# Patient Record
Sex: MALE | Race: WHITE | NOT HISPANIC OR LATINO | Employment: FULL TIME | URBAN - METROPOLITAN AREA
[De-identification: names, ages, dates, MRNs, and addresses within clinical notes are randomized per-mention and may not be internally consistent; named-entity substitution may affect disease eponyms.]

---

## 2023-01-13 ENCOUNTER — HOSPITAL ENCOUNTER (EMERGENCY)
Facility: CLINIC | Age: 23
Discharge: HOME | End: 2023-01-13
Attending: EMERGENCY MEDICINE | Admitting: EMERGENCY MEDICINE
Payer: COMMERCIAL

## 2023-01-13 ENCOUNTER — APPOINTMENT (OUTPATIENT)
Dept: ULTRASOUND IMAGING | Facility: CLINIC | Age: 23
End: 2023-01-13
Attending: EMERGENCY MEDICINE
Payer: COMMERCIAL

## 2023-01-13 VITALS
TEMPERATURE: 97 F | SYSTOLIC BLOOD PRESSURE: 136 MMHG | DIASTOLIC BLOOD PRESSURE: 78 MMHG | OXYGEN SATURATION: 98 % | HEART RATE: 67 BPM | RESPIRATION RATE: 18 BRPM

## 2023-01-13 DIAGNOSIS — K80.50 BILIARY COLIC: ICD-10-CM

## 2023-01-13 LAB
ALBUMIN SERPL-MCNC: 4.4 G/DL (ref 3.4–5)
ALP SERPL-CCNC: 73 U/L (ref 40–150)
ALT SERPL W P-5'-P-CCNC: 42 U/L (ref 0–70)
ANION GAP SERPL CALCULATED.3IONS-SCNC: 7 MMOL/L (ref 3–14)
AST SERPL W P-5'-P-CCNC: 28 U/L (ref 0–45)
BASOPHILS # BLD AUTO: 0 10E3/UL (ref 0–0.2)
BASOPHILS NFR BLD AUTO: 1 %
BILIRUB SERPL-MCNC: 0.4 MG/DL (ref 0.2–1.3)
BUN SERPL-MCNC: 7 MG/DL (ref 7–30)
CALCIUM SERPL-MCNC: 9.9 MG/DL (ref 8.5–10.1)
CHLORIDE BLD-SCNC: 103 MMOL/L (ref 94–109)
CO2 SERPL-SCNC: 30 MMOL/L (ref 20–32)
CREAT SERPL-MCNC: 1.02 MG/DL (ref 0.66–1.25)
EOSINOPHIL # BLD AUTO: 0.1 10E3/UL (ref 0–0.7)
EOSINOPHIL NFR BLD AUTO: 2 %
ERYTHROCYTE [DISTWIDTH] IN BLOOD BY AUTOMATED COUNT: 11.6 % (ref 10–15)
GFR SERPL CREATININE-BSD FRML MDRD: >90 ML/MIN/1.73M2
GLUCOSE BLD-MCNC: 97 MG/DL (ref 70–99)
HCT VFR BLD AUTO: 46 % (ref 40–53)
HGB BLD-MCNC: 15.3 G/DL (ref 13.3–17.7)
IMM GRANULOCYTES # BLD: 0 10E3/UL
IMM GRANULOCYTES NFR BLD: 1 %
LIPASE SERPL-CCNC: 172 U/L (ref 73–393)
LYMPHOCYTES # BLD AUTO: 2.2 10E3/UL (ref 0.8–5.3)
LYMPHOCYTES NFR BLD AUTO: 38 %
MCH RBC QN AUTO: 27.8 PG (ref 26.5–33)
MCHC RBC AUTO-ENTMCNC: 33.3 G/DL (ref 31.5–36.5)
MCV RBC AUTO: 84 FL (ref 78–100)
MONOCYTES # BLD AUTO: 0.4 10E3/UL (ref 0–1.3)
MONOCYTES NFR BLD AUTO: 7 %
NEUTROPHILS # BLD AUTO: 3 10E3/UL (ref 1.6–8.3)
NEUTROPHILS NFR BLD AUTO: 51 %
NRBC # BLD AUTO: 0 10E3/UL
NRBC BLD AUTO-RTO: 0 /100
PLATELET # BLD AUTO: 334 10E3/UL (ref 150–450)
POTASSIUM BLD-SCNC: 4.1 MMOL/L (ref 3.4–5.3)
PROT SERPL-MCNC: 8.6 G/DL (ref 6.8–8.8)
RBC # BLD AUTO: 5.5 10E6/UL (ref 4.4–5.9)
SODIUM SERPL-SCNC: 140 MMOL/L (ref 133–144)
WBC # BLD AUTO: 5.7 10E3/UL (ref 4–11)

## 2023-01-13 PROCEDURE — 80053 COMPREHEN METABOLIC PANEL: CPT | Performed by: EMERGENCY MEDICINE

## 2023-01-13 PROCEDURE — 83690 ASSAY OF LIPASE: CPT | Performed by: EMERGENCY MEDICINE

## 2023-01-13 PROCEDURE — 85014 HEMATOCRIT: CPT | Performed by: EMERGENCY MEDICINE

## 2023-01-13 PROCEDURE — 76705 ECHO EXAM OF ABDOMEN: CPT

## 2023-01-13 PROCEDURE — 99284 EMERGENCY DEPT VISIT MOD MDM: CPT | Mod: 25

## 2023-01-13 PROCEDURE — 36415 COLL VENOUS BLD VENIPUNCTURE: CPT | Performed by: EMERGENCY MEDICINE

## 2023-01-13 ASSESSMENT — ENCOUNTER SYMPTOMS
NAUSEA: 0
FEVER: 0
ABDOMINAL PAIN: 1
VOMITING: 0
DIARRHEA: 0

## 2023-01-13 NOTE — ED PROVIDER NOTES
History     Chief Complaint:  Abdominal Pain       The history is provided by the patient.      Eddie Carey is a 22 year old male who presents with abdominal pain. The patient reports that he has been experiencing abdominal pain for the past few months, originating on the left side but gradually moving to the right. He at first thought it was due to food, but it continued to occur even when eating rather bland meals. His last episode was bout 1.5 weeks ago, causing him pain throughout the night and preventing him from sleeping. He followed up with his PCP, who recommended a diet change as well as blood work and and a CT scan. Testing showed a potential issue with his gall bladder, prompting the patient to present to the ED this morning. He does not have a history of medical problems or surgery.     CT Abdomen & Pelvis - [facility] 1/13/2023  IMPRESSION  1. Gallbladder wall thickening and pericholecystic fat stranding suggestive of acute cholecystitis.    Independent Historian: the patient     Review of External Notes: faxed records from [facility]     ROS:  Review of Systems   Constitutional: Negative for fever.   Gastrointestinal: Positive for abdominal pain (RLQ). Negative for diarrhea, nausea and vomiting.   All other systems reviewed and are negative.        Allergies:  No known drug allergies    Medications:    The patient is not currently taking any prescribed medications.    Past Medical History:    The patient denies any past medical history    Social History:  The patient presents to the ED with his mother; wife is on the way.    Physical Exam     Patient Vitals for the past 24 hrs:   BP Temp Temp src Pulse Resp SpO2   01/13/23 1256 -- -- -- -- -- 98 %   01/13/23 1255 136/78 -- -- 67 -- --   01/13/23 1138 (!) 147/70 -- -- -- -- --   01/13/23 1137 -- 97  F (36.1  C) Temporal 78 18 100 %        Physical Exam  General: Appears well-developed and well-nourished.   Head: No signs of trauma.    CV: Normal rate and regular rhythm.    Resp: Effort normal and breath sounds normal. No respiratory distress.   GI: Soft. There is no tenderness.  No rebound or guarding.  Normal bowel sounds.    MSK: Normal range of motion.   Neuro: The patient is alert and oriented.  Speech normal.  Skin: Skin is warm and dry. No rash noted.   Psych: normal mood and affect. behavior is normal.       Emergency Department Course     Imaging:  US Abdomen Limited (RUQ)   Final Result   IMPRESSION:   1.  Cholelithiasis with borderline gallbladder wall thickening   measuring 4 mm. Findings could represent early acute cholecystitis.   Consider HIDA scan for further evaluation.   2.  Mild hepatic steatosis.      LEDA ROWE MD            SYSTEM ID:  [id number]         Report per radiology    Laboratory:  Labs Ordered and Resulted from Time of ED Arrival to Time of ED Departure   COMPREHENSIVE METABOLIC PANEL - Normal       Result Value    Sodium 140      Potassium 4.1      Chloride 103      Carbon Dioxide (CO2) 30      Anion Gap 7      Urea Nitrogen 7      Creatinine 1.02      Calcium 9.9      Glucose 97      Alkaline Phosphatase 73      AST 28      ALT 42      Protein Total 8.6      Albumin 4.4      Bilirubin Total 0.4      GFR Estimate >90     LIPASE - Normal    Lipase 172     CBC WITH PLATELETS AND DIFFERENTIAL    WBC Count 5.7      RBC Count 5.50      Hemoglobin 15.3      Hematocrit 46.0      MCV 84      MCH 27.8      MCHC 33.3      RDW 11.6      Platelet Count 334      % Neutrophils 51      % Lymphocytes 38      % Monocytes 7      % Eosinophils 2      % Basophils 1      % Immature Granulocytes 1      NRBCs per 100 WBC 0      Absolute Neutrophils 3.0      Absolute Lymphocytes 2.2      Absolute Monocytes 0.4      Absolute Eosinophils 0.1      Absolute Basophils 0.0      Absolute Immature Granulocytes 0.0      Absolute NRBCs 0.0        Emergency Department Course & Assessments:         Consultations/Discussion of Management or  Tests:  1143 I obtained patient history and examined the patient as noted above.   1240 I rechecked the patient and discussed plan for discharge.    Disposition:  The patient was discharged to home.     Impression & Plan      Medical Decision Making:  William Carey s a 22-year-old gentleman who presents due to an abnormal CT scan.  He has had intermittent episodes of some abdominal pain.  Last episode was a week and a half ago.  He had gone to the clinic who did blood work and then ordered a CT scan that was completed today.  CT scan showed concerns for acute cholecystitis so he was sent to the ER.  Upon my evaluation patient had no pain and had no pain with palpation.  I did repeat blood work which was normal.  I did obtain an ultrasound of the gallbladder and this showed borderline thickened gallbladder wall, but no other signs of cholecystitis.  Given the patient was completely symptom-free and otherwise reassuring work-up, I recommended he follow-up with general surgery as his symptoms are consistent with biliary colic.  I did discuss returning for worsening or uncontrolled pain or any other concerns.    Diagnosis:    ICD-10-CM    1. Biliary colic  K80.50          Scribe Disclosure:  I, Ranjeet Rm, am serving as a scribe at 11:41 AM on 1/13/2023 to document services personally performed by Eliazar Hannon MD based on my observations and the provider's statements to me.     1/13/2023   Eliazar Hannon MD Bergenstal, John A, MD  01/13/23 7636

## 2023-01-13 NOTE — ED TRIAGE NOTES
Patient had outpatient CT done and has found out that he has cholecystitis. Denies fevers, chills or nausea.  No pain right now.

## 2023-01-24 ENCOUNTER — TELEPHONE (OUTPATIENT)
Dept: SURGERY | Facility: CLINIC | Age: 23
End: 2023-01-24

## 2023-01-24 ENCOUNTER — OFFICE VISIT (OUTPATIENT)
Dept: SURGERY | Facility: CLINIC | Age: 23
End: 2023-01-24
Payer: COMMERCIAL

## 2023-01-24 ENCOUNTER — HOSPITAL ENCOUNTER (OUTPATIENT)
Facility: CLINIC | Age: 23
End: 2023-01-24
Attending: SURGERY | Admitting: SURGERY
Payer: COMMERCIAL

## 2023-01-24 VITALS
OXYGEN SATURATION: 98 % | HEART RATE: 87 BPM | RESPIRATION RATE: 16 BRPM | DIASTOLIC BLOOD PRESSURE: 78 MMHG | WEIGHT: 185 LBS | SYSTOLIC BLOOD PRESSURE: 120 MMHG | HEIGHT: 72 IN | BODY MASS INDEX: 25.06 KG/M2

## 2023-01-24 DIAGNOSIS — K80.20 SYMPTOMATIC CHOLELITHIASIS: ICD-10-CM

## 2023-01-24 PROCEDURE — 99204 OFFICE O/P NEW MOD 45 MIN: CPT | Performed by: SURGERY

## 2023-01-24 NOTE — TELEPHONE ENCOUNTER
Type of surgery: laparoscopic cholecystectomy  Location of surgery: [facility]  Date and time of surgery: 3/3/23 1:25pm  Surgeon: Dr Guerra  Pre-Op Appt Date: pt to schedule  Post-Op Appt Date: pt to schedule   Packet sent out: Yes  Pre-cert/Authorization completed:  Not Applicable  Date: 1/24/23

## 2023-01-27 NOTE — PROGRESS NOTES
Surgery Consultation, [facility]         Mayito Guerra MD, MD    Eddie Carey MRN# [id number]   YOB: 2000 Age: 22 year old     PCP:  No Ref-Primary, Physician None    Chief Complaint:  Right upper quadrant pain, nausea    History of Present Illness:  Eddie Carey is a 22 year old male who presented with several episodes of upper abdominal pain and intermittent nausea, usually after eating.  Commonly associated with eating greasy foods.  Patient was recently seen in the emergency department where he was diagnosed with probable biliary colic or symptomatic gallstones.  Imaging studies confirmed gallstones and mild wall thickening.  The patient is now here to discuss diagnosis and management options.    PMH:  Eddie Carey  has no past medical history on file.  PSH:  Eddie Carey  has no past surgical history on file.    Home medications and allergies reviewed.    Social History:  Eddie Carey  reports that he has never smoked. He has never used smokeless tobacco.  Family History:  Eddie Carey family history is not on file.    ROS:  The 10 point Review of Systems is negative other than noted in the HPI.  Nausea during episodes of pain, no dark urine or acholic stools..    Physical Exam:  Blood pressure 120/78, pulse 87, resp. rate 16, height 1.829 m (6'), weight 83.9 kg (185 lb), SpO2 98 %.  185 lbs 0 oz  Thin healthy young gentleman in no distress.  Patient has a pleasant affect, speaks without difficulty.   Pupils equal round and reactive to light.   No cervical lymphadenopathy or thyromegaly.   Lung fields clear, breathing comfortably.   Heart normal sinus rhythm.  No murmurs rubs or gallops.  Abdomen soft, nontender, nondistended.  Minimal tenderness in the right upper quadrant, no masses appreciated. No peritoneal signs or rebound.  Skin warm, dry, without rashes or lesions.    All new lab and imaging data was reviewed.  Abdominal ultrasound  shows gallbladder with stones and wall thickening     Assessment/plan:  Eddie Carey is a 22 year old male with signs and symptoms suggesting symptomatic cholelithiasis. I've recommended laparoscopic cholecystectomy. Surgical comorbidities include none.  I feel the patient is a good candidate for the surgery and that this should be able to be done as an outpatient. They were going to consider their options and likely schedule surgery.    Abbe Guerra M.D.  [facility]  [phone]    Please route or send letter to:  Primary Care Provider (PCP) and Referring Provider

## 2023-01-31 ENCOUNTER — HOSPITAL ENCOUNTER (INPATIENT)
Facility: CLINIC | Age: 23
LOS: 2 days | Discharge: HOME | DRG: 418 | End: 2023-02-04
Attending: EMERGENCY MEDICINE | Admitting: INTERNAL MEDICINE
Payer: COMMERCIAL

## 2023-01-31 ENCOUNTER — APPOINTMENT (OUTPATIENT)
Dept: ULTRASOUND IMAGING | Facility: CLINIC | Age: 23
DRG: 418 | End: 2023-01-31
Attending: EMERGENCY MEDICINE
Payer: COMMERCIAL

## 2023-01-31 DIAGNOSIS — G89.18 POSTOPERATIVE PAIN: Primary | ICD-10-CM

## 2023-01-31 DIAGNOSIS — R33.8 ACUTE URINARY RETENTION: ICD-10-CM

## 2023-01-31 DIAGNOSIS — K81.0 ACUTE CHOLECYSTITIS: ICD-10-CM

## 2023-01-31 DIAGNOSIS — K80.50 CHOLEDOCHOLITHIASIS: ICD-10-CM

## 2023-01-31 LAB
ALBUMIN SERPL BCG-MCNC: 5 G/DL (ref 3.5–5.2)
ALP SERPL-CCNC: 91 U/L (ref 40–129)
ALT SERPL W P-5'-P-CCNC: 39 U/L (ref 10–50)
ANION GAP SERPL CALCULATED.3IONS-SCNC: 14 MMOL/L (ref 7–15)
AST SERPL W P-5'-P-CCNC: 42 U/L (ref 10–50)
BASOPHILS # BLD MANUAL: 0.1 10E3/UL (ref 0–0.2)
BASOPHILS NFR BLD MANUAL: 1 %
BILIRUB DIRECT SERPL-MCNC: <0.2 MG/DL (ref 0–0.3)
BILIRUB SERPL-MCNC: 0.4 MG/DL
BUN SERPL-MCNC: 12.2 MG/DL (ref 6–20)
CALCIUM SERPL-MCNC: 9.8 MG/DL (ref 8.6–10)
CHLORIDE SERPL-SCNC: 98 MMOL/L (ref 98–107)
CREAT SERPL-MCNC: 1 MG/DL (ref 0.67–1.17)
DEPRECATED HCO3 PLAS-SCNC: 29 MMOL/L (ref 22–29)
EOSINOPHIL # BLD MANUAL: 0.1 10E3/UL (ref 0–0.7)
EOSINOPHIL NFR BLD MANUAL: 1 %
ERYTHROCYTE [DISTWIDTH] IN BLOOD BY AUTOMATED COUNT: 12 % (ref 10–15)
GFR SERPL CREATININE-BSD FRML MDRD: >90 ML/MIN/1.73M2
GLUCOSE SERPL-MCNC: 128 MG/DL (ref 70–99)
HCT VFR BLD AUTO: 45 % (ref 40–53)
HGB BLD-MCNC: 15.2 G/DL (ref 13.3–17.7)
LIPASE SERPL-CCNC: 40 U/L (ref 13–60)
LYMPHOCYTES # BLD MANUAL: 5.8 10E3/UL (ref 0.8–5.3)
LYMPHOCYTES NFR BLD MANUAL: 53 %
MCH RBC QN AUTO: 27.5 PG (ref 26.5–33)
MCHC RBC AUTO-ENTMCNC: 33.8 G/DL (ref 31.5–36.5)
MCV RBC AUTO: 81 FL (ref 78–100)
MONOCYTES # BLD MANUAL: 0.7 10E3/UL (ref 0–1.3)
MONOCYTES NFR BLD MANUAL: 6 %
MYELOCYTES # BLD MANUAL: 0.1 10E3/UL
MYELOCYTES NFR BLD MANUAL: 1 %
NEUTROPHILS # BLD MANUAL: 4.1 10E3/UL (ref 1.6–8.3)
NEUTROPHILS NFR BLD MANUAL: 38 %
PLAT MORPH BLD: ABNORMAL
PLATELET # BLD AUTO: 248 10E3/UL (ref 150–450)
POTASSIUM SERPL-SCNC: 3.4 MMOL/L (ref 3.4–5.3)
PROT SERPL-MCNC: 8 G/DL (ref 6.4–8.3)
RBC # BLD AUTO: 5.53 10E6/UL (ref 4.4–5.9)
RBC MORPH BLD: ABNORMAL
SODIUM SERPL-SCNC: 141 MMOL/L (ref 136–145)
WBC # BLD AUTO: 10.9 10E3/UL (ref 4–11)

## 2023-01-31 PROCEDURE — 82248 BILIRUBIN DIRECT: CPT | Performed by: EMERGENCY MEDICINE

## 2023-01-31 PROCEDURE — 81001 URINALYSIS AUTO W/SCOPE: CPT | Performed by: EMERGENCY MEDICINE

## 2023-01-31 PROCEDURE — 99285 EMERGENCY DEPT VISIT HI MDM: CPT | Mod: 25

## 2023-01-31 PROCEDURE — 85007 BL SMEAR W/DIFF WBC COUNT: CPT | Performed by: EMERGENCY MEDICINE

## 2023-01-31 PROCEDURE — 85027 COMPLETE CBC AUTOMATED: CPT | Performed by: EMERGENCY MEDICINE

## 2023-01-31 PROCEDURE — 96361 HYDRATE IV INFUSION ADD-ON: CPT

## 2023-01-31 PROCEDURE — 36415 COLL VENOUS BLD VENIPUNCTURE: CPT | Performed by: EMERGENCY MEDICINE

## 2023-01-31 PROCEDURE — 83690 ASSAY OF LIPASE: CPT | Performed by: EMERGENCY MEDICINE

## 2023-01-31 PROCEDURE — 76705 ECHO EXAM OF ABDOMEN: CPT

## 2023-01-31 PROCEDURE — 96375 TX/PRO/DX INJ NEW DRUG ADDON: CPT

## 2023-01-31 PROCEDURE — 96374 THER/PROPH/DIAG INJ IV PUSH: CPT

## 2023-01-31 PROCEDURE — 258N000003 HC RX IP 258 OP 636: Performed by: EMERGENCY MEDICINE

## 2023-01-31 PROCEDURE — 250N000011 HC RX IP 250 OP 636: Performed by: EMERGENCY MEDICINE

## 2023-01-31 RX ORDER — HYDROMORPHONE HYDROCHLORIDE 1 MG/ML
0.5 INJECTION, SOLUTION INTRAMUSCULAR; INTRAVENOUS; SUBCUTANEOUS
Status: DISCONTINUED | OUTPATIENT
Start: 2023-01-31 | End: 2023-02-01

## 2023-01-31 RX ORDER — KETOROLAC TROMETHAMINE 15 MG/ML
30 INJECTION, SOLUTION INTRAMUSCULAR; INTRAVENOUS ONCE
Status: COMPLETED | OUTPATIENT
Start: 2023-01-31 | End: 2023-01-31

## 2023-01-31 RX ORDER — ONDANSETRON 2 MG/ML
4 INJECTION INTRAMUSCULAR; INTRAVENOUS EVERY 30 MIN PRN
Status: DISCONTINUED | OUTPATIENT
Start: 2023-01-31 | End: 2023-02-01

## 2023-01-31 RX ADMIN — KETOROLAC TROMETHAMINE 30 MG: 15 INJECTION, SOLUTION INTRAMUSCULAR; INTRAVENOUS at 22:16

## 2023-01-31 RX ADMIN — SODIUM CHLORIDE 1000 ML: 9 INJECTION, SOLUTION INTRAVENOUS at 22:16

## 2023-01-31 RX ADMIN — HYDROMORPHONE HYDROCHLORIDE 0.5 MG: 1 INJECTION, SOLUTION INTRAMUSCULAR; INTRAVENOUS; SUBCUTANEOUS at 23:15

## 2023-01-31 RX ADMIN — ONDANSETRON 4 MG: 2 INJECTION INTRAMUSCULAR; INTRAVENOUS at 22:20

## 2023-01-31 ASSESSMENT — ENCOUNTER SYMPTOMS
VOMITING: 1
ABDOMINAL PAIN: 1
FEVER: 0
NAUSEA: 1

## 2023-01-31 ASSESSMENT — ACTIVITIES OF DAILY LIVING (ADL): ADLS_ACUITY_SCORE: 35

## 2023-02-01 ENCOUNTER — ANESTHESIA EVENT (OUTPATIENT)
Dept: SURGERY | Facility: CLINIC | Age: 23
DRG: 418 | End: 2023-02-01
Payer: COMMERCIAL

## 2023-02-01 ENCOUNTER — ANESTHESIA (OUTPATIENT)
Dept: SURGERY | Facility: CLINIC | Age: 23
DRG: 418 | End: 2023-02-01
Payer: COMMERCIAL

## 2023-02-01 LAB
ALBUMIN SERPL BCG-MCNC: 4.1 G/DL (ref 3.5–5.2)
ALBUMIN UR-MCNC: 10 MG/DL
ALP SERPL-CCNC: 88 U/L (ref 40–129)
ALT SERPL W P-5'-P-CCNC: 203 U/L (ref 10–50)
AMORPH CRY #/AREA URNS HPF: ABNORMAL /HPF
ANION GAP SERPL CALCULATED.3IONS-SCNC: 10 MMOL/L (ref 7–15)
APPEARANCE UR: ABNORMAL
AST SERPL W P-5'-P-CCNC: 290 U/L (ref 10–50)
BILIRUB DIRECT SERPL-MCNC: 0.72 MG/DL (ref 0–0.3)
BILIRUB SERPL-MCNC: 1.2 MG/DL
BILIRUB UR QL STRIP: NEGATIVE
BUN SERPL-MCNC: 11.2 MG/DL (ref 6–20)
CALCIUM SERPL-MCNC: 9.1 MG/DL (ref 8.6–10)
CHLORIDE SERPL-SCNC: 100 MMOL/L (ref 98–107)
COLOR UR AUTO: YELLOW
CREAT SERPL-MCNC: 1.02 MG/DL (ref 0.67–1.17)
DEPRECATED HCO3 PLAS-SCNC: 26 MMOL/L (ref 22–29)
ERYTHROCYTE [DISTWIDTH] IN BLOOD BY AUTOMATED COUNT: 12 % (ref 10–15)
GFR SERPL CREATININE-BSD FRML MDRD: >90 ML/MIN/1.73M2
GLUCOSE SERPL-MCNC: 105 MG/DL (ref 70–99)
GLUCOSE UR STRIP-MCNC: NEGATIVE MG/DL
HCT VFR BLD AUTO: 42.7 % (ref 40–53)
HGB BLD-MCNC: 13.9 G/DL (ref 13.3–17.7)
HGB UR QL STRIP: NEGATIVE
KETONES UR STRIP-MCNC: NEGATIVE MG/DL
LEUKOCYTE ESTERASE UR QL STRIP: NEGATIVE
MCH RBC QN AUTO: 27 PG (ref 26.5–33)
MCHC RBC AUTO-ENTMCNC: 32.6 G/DL (ref 31.5–36.5)
MCV RBC AUTO: 83 FL (ref 78–100)
MUCOUS THREADS #/AREA URNS LPF: PRESENT /LPF
NITRATE UR QL: NEGATIVE
PH UR STRIP: 6.5 [PH] (ref 5–7)
PLATELET # BLD AUTO: 170 10E3/UL (ref 150–450)
POTASSIUM SERPL-SCNC: 4.2 MMOL/L (ref 3.4–5.3)
PROT SERPL-MCNC: 6.6 G/DL (ref 6.4–8.3)
RBC # BLD AUTO: 5.14 10E6/UL (ref 4.4–5.9)
RBC URINE: <1 /HPF
SODIUM SERPL-SCNC: 136 MMOL/L (ref 136–145)
SP GR UR STRIP: 1.03 (ref 1–1.03)
UROBILINOGEN UR STRIP-MCNC: NORMAL MG/DL
WBC # BLD AUTO: 6.7 10E3/UL (ref 4–11)
WBC URINE: 1 /HPF

## 2023-02-01 PROCEDURE — 250N000011 HC RX IP 250 OP 636: Performed by: NURSE ANESTHETIST, CERTIFIED REGISTERED

## 2023-02-01 PROCEDURE — 96376 TX/PRO/DX INJ SAME DRUG ADON: CPT

## 2023-02-01 PROCEDURE — 999N000141 HC STATISTIC PRE-PROCEDURE NURSING ASSESSMENT: Performed by: SURGERY

## 2023-02-01 PROCEDURE — G0378 HOSPITAL OBSERVATION PER HR: HCPCS

## 2023-02-01 PROCEDURE — 250N000025 HC SEVOFLURANE, PER MIN: Performed by: SURGERY

## 2023-02-01 PROCEDURE — 99222 1ST HOSP IP/OBS MODERATE 55: CPT | Mod: 57 | Performed by: SURGERY

## 2023-02-01 PROCEDURE — 36415 COLL VENOUS BLD VENIPUNCTURE: CPT | Performed by: INTERNAL MEDICINE

## 2023-02-01 PROCEDURE — 370N000017 HC ANESTHESIA TECHNICAL FEE, PER MIN: Performed by: SURGERY

## 2023-02-01 PROCEDURE — 250N000011 HC RX IP 250 OP 636: Performed by: ANESTHESIOLOGY

## 2023-02-01 PROCEDURE — 47563 LAPARO CHOLECYSTECTOMY/GRAPH: CPT | Performed by: SURGERY

## 2023-02-01 PROCEDURE — 88304 TISSUE EXAM BY PATHOLOGIST: CPT | Mod: TC | Performed by: SURGERY

## 2023-02-01 PROCEDURE — 0FT44ZZ RESECTION OF GALLBLADDER, PERCUTANEOUS ENDOSCOPIC APPROACH: ICD-10-PCS | Performed by: SURGERY

## 2023-02-01 PROCEDURE — 360N000076 HC SURGERY LEVEL 3, PER MIN: Performed by: SURGERY

## 2023-02-01 PROCEDURE — 710N000009 HC RECOVERY PHASE 1, LEVEL 1, PER MIN: Performed by: SURGERY

## 2023-02-01 PROCEDURE — 82248 BILIRUBIN DIRECT: CPT | Performed by: INTERNAL MEDICINE

## 2023-02-01 PROCEDURE — 250N000011 HC RX IP 250 OP 636: Performed by: INTERNAL MEDICINE

## 2023-02-01 PROCEDURE — 250N000009 HC RX 250: Performed by: NURSE ANESTHETIST, CERTIFIED REGISTERED

## 2023-02-01 PROCEDURE — 82310 ASSAY OF CALCIUM: CPT | Performed by: INTERNAL MEDICINE

## 2023-02-01 PROCEDURE — 258N000003 HC RX IP 258 OP 636: Performed by: ANESTHESIOLOGY

## 2023-02-01 PROCEDURE — 258N000003 HC RX IP 258 OP 636: Performed by: INTERNAL MEDICINE

## 2023-02-01 PROCEDURE — 258N000003 HC RX IP 258 OP 636: Performed by: NURSE ANESTHETIST, CERTIFIED REGISTERED

## 2023-02-01 PROCEDURE — 250N000009 HC RX 250: Performed by: SURGERY

## 2023-02-01 PROCEDURE — 96365 THER/PROPH/DIAG IV INF INIT: CPT

## 2023-02-01 PROCEDURE — 96361 HYDRATE IV INFUSION ADD-ON: CPT

## 2023-02-01 PROCEDURE — 85027 COMPLETE CBC AUTOMATED: CPT | Performed by: INTERNAL MEDICINE

## 2023-02-01 PROCEDURE — 272N000001 HC OR GENERAL SUPPLY STERILE: Performed by: SURGERY

## 2023-02-01 PROCEDURE — 250N000011 HC RX IP 250 OP 636: Performed by: SURGERY

## 2023-02-01 PROCEDURE — 99222 1ST HOSP IP/OBS MODERATE 55: CPT | Mod: AI | Performed by: INTERNAL MEDICINE

## 2023-02-01 PROCEDURE — 88304 TISSUE EXAM BY PATHOLOGIST: CPT | Mod: 26 | Performed by: PATHOLOGY

## 2023-02-01 RX ORDER — FENTANYL CITRATE 50 UG/ML
INJECTION, SOLUTION INTRAMUSCULAR; INTRAVENOUS PRN
Status: DISCONTINUED | OUTPATIENT
Start: 2023-02-01 | End: 2023-02-01

## 2023-02-01 RX ORDER — ACETAMINOPHEN 650 MG/1
650 SUPPOSITORY RECTAL EVERY 6 HOURS PRN
Status: DISCONTINUED | OUTPATIENT
Start: 2023-02-01 | End: 2023-02-04 | Stop reason: HOSPADM

## 2023-02-01 RX ORDER — AMOXICILLIN 250 MG
1 CAPSULE ORAL 2 TIMES DAILY PRN
Status: DISCONTINUED | OUTPATIENT
Start: 2023-02-01 | End: 2023-02-04 | Stop reason: HOSPADM

## 2023-02-01 RX ORDER — HYDROMORPHONE HCL IN WATER/PF 6 MG/30 ML
0.4 PATIENT CONTROLLED ANALGESIA SYRINGE INTRAVENOUS
Status: DISCONTINUED | OUTPATIENT
Start: 2023-02-01 | End: 2023-02-04 | Stop reason: HOSPADM

## 2023-02-01 RX ORDER — AMOXICILLIN 250 MG
2 CAPSULE ORAL 2 TIMES DAILY PRN
Status: DISCONTINUED | OUTPATIENT
Start: 2023-02-01 | End: 2023-02-04 | Stop reason: HOSPADM

## 2023-02-01 RX ORDER — BUPIVACAINE HYDROCHLORIDE AND EPINEPHRINE 2.5; 5 MG/ML; UG/ML
INJECTION, SOLUTION INFILTRATION; PERINEURAL PRN
Status: DISCONTINUED | OUTPATIENT
Start: 2023-02-01 | End: 2023-02-01 | Stop reason: HOSPADM

## 2023-02-01 RX ORDER — OXYCODONE HYDROCHLORIDE 5 MG/1
10 TABLET ORAL EVERY 4 HOURS PRN
Status: DISCONTINUED | OUTPATIENT
Start: 2023-02-01 | End: 2023-02-01 | Stop reason: HOSPADM

## 2023-02-01 RX ORDER — HYDROMORPHONE HCL IN WATER/PF 6 MG/30 ML
0.4 PATIENT CONTROLLED ANALGESIA SYRINGE INTRAVENOUS EVERY 5 MIN PRN
Status: DISCONTINUED | OUTPATIENT
Start: 2023-02-01 | End: 2023-02-01 | Stop reason: HOSPADM

## 2023-02-01 RX ORDER — ONDANSETRON 4 MG/1
4 TABLET, ORALLY DISINTEGRATING ORAL EVERY 30 MIN PRN
Status: DISCONTINUED | OUTPATIENT
Start: 2023-02-01 | End: 2023-02-01 | Stop reason: HOSPADM

## 2023-02-01 RX ORDER — SENNOSIDES A AND B 8.6 MG/1
1 TABLET, FILM COATED ORAL 2 TIMES DAILY
Qty: 30 TABLET | Refills: 0 | Status: SHIPPED | OUTPATIENT
Start: 2023-02-01 | End: 2024-07-31

## 2023-02-01 RX ORDER — OXYCODONE HYDROCHLORIDE 5 MG/1
5 TABLET ORAL EVERY 4 HOURS PRN
Status: DISCONTINUED | OUTPATIENT
Start: 2023-02-01 | End: 2023-02-04 | Stop reason: HOSPADM

## 2023-02-01 RX ORDER — ONDANSETRON 2 MG/ML
INJECTION INTRAMUSCULAR; INTRAVENOUS PRN
Status: DISCONTINUED | OUTPATIENT
Start: 2023-02-01 | End: 2023-02-01

## 2023-02-01 RX ORDER — FENTANYL CITRATE 50 UG/ML
50 INJECTION, SOLUTION INTRAMUSCULAR; INTRAVENOUS EVERY 5 MIN PRN
Status: DISCONTINUED | OUTPATIENT
Start: 2023-02-01 | End: 2023-02-01 | Stop reason: HOSPADM

## 2023-02-01 RX ORDER — LIDOCAINE HYDROCHLORIDE 20 MG/ML
INJECTION, SOLUTION INFILTRATION; PERINEURAL PRN
Status: DISCONTINUED | OUTPATIENT
Start: 2023-02-01 | End: 2023-02-01

## 2023-02-01 RX ORDER — FENTANYL CITRATE 50 UG/ML
25 INJECTION, SOLUTION INTRAMUSCULAR; INTRAVENOUS EVERY 5 MIN PRN
Status: DISCONTINUED | OUTPATIENT
Start: 2023-02-01 | End: 2023-02-01 | Stop reason: HOSPADM

## 2023-02-01 RX ORDER — ACETAMINOPHEN 325 MG/1
650 TABLET ORAL EVERY 6 HOURS PRN
Status: DISCONTINUED | OUTPATIENT
Start: 2023-02-01 | End: 2023-02-04 | Stop reason: HOSPADM

## 2023-02-01 RX ORDER — GLYCOPYRROLATE 0.2 MG/ML
INJECTION, SOLUTION INTRAMUSCULAR; INTRAVENOUS PRN
Status: DISCONTINUED | OUTPATIENT
Start: 2023-02-01 | End: 2023-02-01

## 2023-02-01 RX ORDER — SODIUM CHLORIDE, SODIUM LACTATE, POTASSIUM CHLORIDE, CALCIUM CHLORIDE 600; 310; 30; 20 MG/100ML; MG/100ML; MG/100ML; MG/100ML
INJECTION, SOLUTION INTRAVENOUS CONTINUOUS
Status: DISCONTINUED | OUTPATIENT
Start: 2023-02-01 | End: 2023-02-01 | Stop reason: HOSPADM

## 2023-02-01 RX ORDER — ONDANSETRON 4 MG/1
4 TABLET, ORALLY DISINTEGRATING ORAL EVERY 6 HOURS PRN
Qty: 10 TABLET | Refills: 0 | Status: SHIPPED | OUTPATIENT
Start: 2023-02-01

## 2023-02-01 RX ORDER — SODIUM CHLORIDE 9 MG/ML
INJECTION, SOLUTION INTRAVENOUS CONTINUOUS
Status: DISCONTINUED | OUTPATIENT
Start: 2023-02-01 | End: 2023-02-04

## 2023-02-01 RX ORDER — PROPOFOL 10 MG/ML
INJECTION, EMULSION INTRAVENOUS PRN
Status: DISCONTINUED | OUTPATIENT
Start: 2023-02-01 | End: 2023-02-01

## 2023-02-01 RX ORDER — PIPERACILLIN SODIUM, TAZOBACTAM SODIUM 3; .375 G/15ML; G/15ML
3.38 INJECTION, POWDER, LYOPHILIZED, FOR SOLUTION INTRAVENOUS EVERY 6 HOURS
Status: DISCONTINUED | OUTPATIENT
Start: 2023-02-01 | End: 2023-02-04 | Stop reason: HOSPADM

## 2023-02-01 RX ORDER — OXYCODONE HYDROCHLORIDE 5 MG/1
5 TABLET ORAL EVERY 4 HOURS PRN
Status: DISCONTINUED | OUTPATIENT
Start: 2023-02-01 | End: 2023-02-01 | Stop reason: HOSPADM

## 2023-02-01 RX ORDER — ONDANSETRON 4 MG/1
4 TABLET, ORALLY DISINTEGRATING ORAL EVERY 6 HOURS PRN
Status: DISCONTINUED | OUTPATIENT
Start: 2023-02-01 | End: 2023-02-04 | Stop reason: HOSPADM

## 2023-02-01 RX ORDER — DEXAMETHASONE SODIUM PHOSPHATE 4 MG/ML
INJECTION, SOLUTION INTRA-ARTICULAR; INTRALESIONAL; INTRAMUSCULAR; INTRAVENOUS; SOFT TISSUE PRN
Status: DISCONTINUED | OUTPATIENT
Start: 2023-02-01 | End: 2023-02-01

## 2023-02-01 RX ORDER — ONDANSETRON 2 MG/ML
4 INJECTION INTRAMUSCULAR; INTRAVENOUS EVERY 30 MIN PRN
Status: DISCONTINUED | OUTPATIENT
Start: 2023-02-01 | End: 2023-02-01 | Stop reason: HOSPADM

## 2023-02-01 RX ORDER — PROPOFOL 10 MG/ML
INJECTION, EMULSION INTRAVENOUS CONTINUOUS PRN
Status: DISCONTINUED | OUTPATIENT
Start: 2023-02-01 | End: 2023-02-01

## 2023-02-01 RX ORDER — DEXMEDETOMIDINE HYDROCHLORIDE 4 UG/ML
INJECTION, SOLUTION INTRAVENOUS PRN
Status: DISCONTINUED | OUTPATIENT
Start: 2023-02-01 | End: 2023-02-01

## 2023-02-01 RX ORDER — NEOSTIGMINE METHYLSULFATE 1 MG/ML
VIAL (ML) INJECTION PRN
Status: DISCONTINUED | OUTPATIENT
Start: 2023-02-01 | End: 2023-02-01

## 2023-02-01 RX ORDER — MAGNESIUM HYDROXIDE 1200 MG/15ML
LIQUID ORAL PRN
Status: DISCONTINUED | OUTPATIENT
Start: 2023-02-01 | End: 2023-02-01 | Stop reason: HOSPADM

## 2023-02-01 RX ORDER — HYDROMORPHONE HCL IN WATER/PF 6 MG/30 ML
0.2 PATIENT CONTROLLED ANALGESIA SYRINGE INTRAVENOUS EVERY 5 MIN PRN
Status: DISCONTINUED | OUTPATIENT
Start: 2023-02-01 | End: 2023-02-01 | Stop reason: HOSPADM

## 2023-02-01 RX ORDER — ONDANSETRON 2 MG/ML
4 INJECTION INTRAMUSCULAR; INTRAVENOUS EVERY 6 HOURS PRN
Status: DISCONTINUED | OUTPATIENT
Start: 2023-02-01 | End: 2023-02-04 | Stop reason: HOSPADM

## 2023-02-01 RX ORDER — KETOROLAC TROMETHAMINE 30 MG/ML
INJECTION, SOLUTION INTRAMUSCULAR; INTRAVENOUS PRN
Status: DISCONTINUED | OUTPATIENT
Start: 2023-02-01 | End: 2023-02-01

## 2023-02-01 RX ORDER — SODIUM CHLORIDE, SODIUM LACTATE, POTASSIUM CHLORIDE, CALCIUM CHLORIDE 600; 310; 30; 20 MG/100ML; MG/100ML; MG/100ML; MG/100ML
INJECTION, SOLUTION INTRAVENOUS CONTINUOUS PRN
Status: DISCONTINUED | OUTPATIENT
Start: 2023-02-01 | End: 2023-02-01

## 2023-02-01 RX ORDER — HYDROMORPHONE HYDROCHLORIDE 1 MG/ML
INJECTION, SOLUTION INTRAMUSCULAR; INTRAVENOUS; SUBCUTANEOUS PRN
Status: DISCONTINUED | OUTPATIENT
Start: 2023-02-01 | End: 2023-02-01

## 2023-02-01 RX ORDER — HYDROMORPHONE HCL IN WATER/PF 6 MG/30 ML
0.2 PATIENT CONTROLLED ANALGESIA SYRINGE INTRAVENOUS
Status: DISCONTINUED | OUTPATIENT
Start: 2023-02-01 | End: 2023-02-04 | Stop reason: HOSPADM

## 2023-02-01 RX ORDER — LIDOCAINE 40 MG/G
CREAM TOPICAL
Status: DISCONTINUED | OUTPATIENT
Start: 2023-02-01 | End: 2023-02-04 | Stop reason: HOSPADM

## 2023-02-01 RX ORDER — HYDROCODONE BITARTRATE AND ACETAMINOPHEN 5; 325 MG/1; MG/1
1 TABLET ORAL EVERY 4 HOURS PRN
Qty: 10 TABLET | Refills: 0 | Status: SHIPPED | OUTPATIENT
Start: 2023-02-01 | End: 2023-02-04

## 2023-02-01 RX ADMIN — DEXMEDETOMIDINE HYDROCHLORIDE 20 MCG: 200 INJECTION INTRAVENOUS at 15:46

## 2023-02-01 RX ADMIN — PROPOFOL 200 MG: 10 INJECTION, EMULSION INTRAVENOUS at 15:03

## 2023-02-01 RX ADMIN — DEXMEDETOMIDINE HYDROCHLORIDE 12 MCG: 4 INJECTION, SOLUTION INTRAVENOUS at 16:53

## 2023-02-01 RX ADMIN — SODIUM CHLORIDE: 9 INJECTION, SOLUTION INTRAVENOUS at 04:02

## 2023-02-01 RX ADMIN — KETOROLAC TROMETHAMINE 15 MG: 30 INJECTION, SOLUTION INTRAMUSCULAR at 16:34

## 2023-02-01 RX ADMIN — LIDOCAINE HYDROCHLORIDE 100 MG: 20 INJECTION, SOLUTION INFILTRATION; PERINEURAL at 15:03

## 2023-02-01 RX ADMIN — PROPOFOL 25 MCG/KG/MIN: 10 INJECTION, EMULSION INTRAVENOUS at 15:11

## 2023-02-01 RX ADMIN — SODIUM CHLORIDE: 9 INJECTION, SOLUTION INTRAVENOUS at 08:30

## 2023-02-01 RX ADMIN — SODIUM CHLORIDE, POTASSIUM CHLORIDE, SODIUM LACTATE AND CALCIUM CHLORIDE: 600; 310; 30; 20 INJECTION, SOLUTION INTRAVENOUS at 16:36

## 2023-02-01 RX ADMIN — ONDANSETRON 4 MG: 2 INJECTION INTRAMUSCULAR; INTRAVENOUS at 17:31

## 2023-02-01 RX ADMIN — ROCURONIUM BROMIDE 50 MG: 50 INJECTION, SOLUTION INTRAVENOUS at 15:03

## 2023-02-01 RX ADMIN — MIDAZOLAM 2 MG: 1 INJECTION INTRAMUSCULAR; INTRAVENOUS at 14:58

## 2023-02-01 RX ADMIN — PIPERACILLIN AND TAZOBACTAM 3.38 G: 3; .375 INJECTION, POWDER, FOR SOLUTION INTRAVENOUS at 20:11

## 2023-02-01 RX ADMIN — SODIUM CHLORIDE, POTASSIUM CHLORIDE, SODIUM LACTATE AND CALCIUM CHLORIDE: 600; 310; 30; 20 INJECTION, SOLUTION INTRAVENOUS at 18:40

## 2023-02-01 RX ADMIN — ONDANSETRON 4 MG: 2 INJECTION INTRAMUSCULAR; INTRAVENOUS at 14:58

## 2023-02-01 RX ADMIN — SODIUM CHLORIDE: 9 INJECTION, SOLUTION INTRAVENOUS at 20:57

## 2023-02-01 RX ADMIN — GLYCOPYRROLATE 0.8 MG: 0.2 INJECTION, SOLUTION INTRAMUSCULAR; INTRAVENOUS at 16:34

## 2023-02-01 RX ADMIN — HYDROMORPHONE HYDROCHLORIDE 0.5 MG: 1 INJECTION, SOLUTION INTRAMUSCULAR; INTRAVENOUS; SUBCUTANEOUS at 16:21

## 2023-02-01 RX ADMIN — PIPERACILLIN AND TAZOBACTAM 3.38 G: 3; .375 INJECTION, POWDER, FOR SOLUTION INTRAVENOUS at 06:55

## 2023-02-01 RX ADMIN — FENTANYL CITRATE 100 MCG: 50 INJECTION, SOLUTION INTRAMUSCULAR; INTRAVENOUS at 14:58

## 2023-02-01 RX ADMIN — NEOSTIGMINE METHYLSULFATE 4 MG: 1 INJECTION, SOLUTION INTRAVENOUS at 16:34

## 2023-02-01 RX ADMIN — HYDROMORPHONE HYDROCHLORIDE 0.5 MG: 1 INJECTION, SOLUTION INTRAMUSCULAR; INTRAVENOUS; SUBCUTANEOUS at 03:32

## 2023-02-01 RX ADMIN — SODIUM CHLORIDE, POTASSIUM CHLORIDE, SODIUM LACTATE AND CALCIUM CHLORIDE: 600; 310; 30; 20 INJECTION, SOLUTION INTRAVENOUS at 14:58

## 2023-02-01 RX ADMIN — DEXAMETHASONE SODIUM PHOSPHATE 4 MG: 4 INJECTION, SOLUTION INTRA-ARTICULAR; INTRALESIONAL; INTRAMUSCULAR; INTRAVENOUS; SOFT TISSUE at 15:08

## 2023-02-01 RX ADMIN — PIPERACILLIN AND TAZOBACTAM 3.38 G: 3; .375 INJECTION, POWDER, FOR SOLUTION INTRAVENOUS at 13:00

## 2023-02-01 ASSESSMENT — LIFESTYLE VARIABLES: TOBACCO_USE: 0

## 2023-02-01 ASSESSMENT — ACTIVITIES OF DAILY LIVING (ADL)
ADLS_ACUITY_SCORE: 35

## 2023-02-01 ASSESSMENT — ENCOUNTER SYMPTOMS: SEIZURES: 0

## 2023-02-01 NOTE — ANESTHESIA PROCEDURE NOTES
Airway       Patient location during procedure: OR       Procedure Start/Stop Times: 2/1/2023 3:05 PM  Staff -        CRNA: Jozef Melton APRN CRNA       Performed By: CRNA  Consent for Airway        Urgency: elective  Indications and Patient Condition       Indications for airway management: wendy-procedural       Induction type:intravenous       Mask difficulty assessment: 1 - vent by mask    Final Airway Details       Final airway type: endotracheal airway       Successful airway: ETT - single  Endotracheal Airway Details        ETT size (mm): 8.0       Cuffed: yes       Successful intubation technique: direct laryngoscopy       DL Blade Type: MAC 3       Grade View of Cords: 1       Adjucts: stylet       Bite block used: None    Post intubation assessment        Placement verified by: capnometry, equal breath sounds and chest rise        Number of attempts at approach: 1       Secured with: pink tape       Ease of procedure: easy       Dentition: Intact    Medication(s) Administered   Medication Administration Time: 2/1/2023 3:05 PM

## 2023-02-01 NOTE — ED NOTES
[facility]  ED Nurse Handoff Report    ED Chief complaint: Abdominal Pain      ED Diagnosis:   Final diagnoses:   Acute cholecystitis       Code Status: Full Code    Allergies: No Known Allergies    Patient Story: Patient came in with mid abdominal pain.  Has been going on for a while, been seen by a GI specialist and has surgery to get his gallbladder removed in March.  Pt has been having recurrent gallstones.  Pt stated the pain started tonight about 30 mins prior to arrival after he ate dinner.  Was also having nausea and vomiting.  Pt is alert and oriented.   Focused Assessment:      Treatments and/or interventions provided: ultrasound, labs and see MAR  Patient's response to treatments and/or interventions: good    To be done/followed up on inpatient unit:  see orders    Does this patient have any cognitive concerns?: none    Activity level - Baseline/Home:  Independent  Activity Level - Current:   Independent    Patient's Preferred language: English   Needed?: No    Isolation: None  Infection: Not Applicable  Patient tested for COVID 19 prior to admission: NO  Bariatric?: No    Vital Signs:   Vitals:    01/31/23 2153 01/31/23 2154 01/31/23 2155 01/31/23 2313   BP:  (!) 166/95  133/85   Pulse:  64  80   Resp:  22     Temp:  99.7  F (37.6  C)     TempSrc:  Temporal     SpO2:   98% 98%   Weight: 81.6 kg (180 lb)      Height: 1.829 m (6')          Cardiac Rhythm:     Was the PSS-3 completed:     What interventions are required if any?               Family Comments:   OBS brochure/video discussed/provided to patient/family:               Name of person given brochure if not patient:               Relationship to patient:     For the majority of the shift this patient's behavior was .   Behavioral interventions performed were .    ED NURSE PHONE NUMBER: [phone]        
abdomen/Right:

## 2023-02-01 NOTE — OP NOTE
Surgeon: Jeff Escalona MD  1st Assistant: Anat Silva MD.  PREOPERATIVE DIAGNOSIS: Acute on chronic cholecystitis.   POSTOPERATIVE DIAGNOSES: Same  PROCEDURE: Laparoscopic cholecystectomy.   ANESTHESIA: General.   ESTIMATED BLOOD LOSS: Less than 15 mL.   OPERATIVE PROCEDURE: After induction of general endotracheal anesthesia, Eddie Carey abdomen was prepped and draped in the usual sterile fashion. With the Tyler technique in an periumbilical location, the abdomen was entered and pneumoperitoneum was established. Three additional 5 mm trocars were placed along the right hypochondrium, later one was exchanged to 12 mm in the sub xyphoid location. The gallbladder was decompressed and the fundus was retracted cephalad and lateral and the infundibulum was retracted caudad and lateral. The peritoneum investing the triangle of Calot, which was very inflamed, was incised with electrocautery and dissected bluntly. The critical view of a single cystic duct, single cystic artery was achieved, the artery was doubly clipped on the patient's side, singly clipped on the gallbladder side and transected sharply.  The duct was controlled with a linear stapler.  The gallbladder was detached from the liver with electrocautery and blunt dissection. The specimen was removed from the patient's abdomen and sent to pathology. The gallbladder fossa was inspected. Hemostasis was secured with clips and cautery, and no evidence of bile leakage noted. The abdomen was surveyed and no other pathology seen. The trocars were then removed under direct visualization without evidence of bleeding. Periumbilical and sub xyphoid ports  were closed with multiple interrupted 0 Vicryl suture. Skin was approximated with absorbable sutures. Steri-Strips and sterile dressing applied. No immediate complications.   JEFF ESCALONA MD

## 2023-02-01 NOTE — CONSULTS
[facility] General Surgery Consultation    Eddie Carey MRN# [id number]   YOB: 2000 Age: 22 year old      Date of Admission:  1/31/2023  Date of Consult: 2/1/2023         Assessment and Plan:   Patient is a 22 year old male with acute cholecystitis and possible choledocholithiasis given transaminitis, elevated tBili, and extrahepatic biliary dilatation    PLAN:  - Keep NPO, continue IV fluids and zosyn. Antiemetics and analgesia prn.  - Proceed with laparoscopic cholecystectomy with possible intraoperative cholangiogram today.  - If negative intraoperative cholangiogram, can discharge as early as today. Will consult GI team if worsening LFTs or positive intraoperative cholangiogram.  - The benefits and risks of surgical intervention versus non-operative management including but not limited to infection, bleeding, conversion to open, bile leak, bile duct injury, retained gallstones, injuring neighboring structures, and anesthesia complications with the patient. He expressed understanding and would like to proceed with surgery. All questions were answered to the patient's satisfaction.         Requesting Physician:      Dr. Khalil        Chief Complaint:     Chief Complaint   Patient presents with     Abdominal Pain          History of Present Illness:   Eddie Carey is a 22 year old male who presented to ED last night for abdominal pain. He was seen at our clinic for consultation given biliary colic on 1/24 and scheduled for laparoscopic cholecystectomy on March 3rd. Patient endorses pain in central abdomen and RUQ which worsened yesterday. The pain was more severe than pain he's ever had with past episodes of biliary colic. This pain did not go away on its own and nothing at home helped it. He reports episodes of upper abdominal pain did not lessen following limiting fat and oral intake since his office visit. Pain this time was associated with nausea, vomiting, and  diaphoresis. No fevers. He's had intermittent loose BM's. Ultrasound on 1/13 noted cholelithiasis and borderline gallbladder wall thickening. Labs at that time were notable for normal white count, LFTs, and lipase. Ultrasound yesterday again visualized cholelithiasis but mild gallbladder wall thickening with slightly more pronounced, positive sonographic Lion's sign, and mild dilatation of extrahepatic bile duct now present. Labs this morning were remarkable for normal white count, transaminitis (, ), normal tBili, but elevated dBili (0.72).    Eddie has no medical conditions and does not take medications including blood thinners. No prior abdominal surgeries. Some nausea following narcotics with wisdom teeth removal. No pertinent family history.          Physical Exam:   Blood pressure (!) 151/85, pulse 74, temperature 98.3  F (36.8  C), temperature source Oral, resp. rate 18, height 1.829 m (6'), weight 81.6 kg (180 lb), SpO2 97 %.  180 lbs 0 oz  General: Vital signs reviewed, in no apparent distress  Eyes: Anicteric  HENT: Normocephalic, atraumatic, trachea midline   Respiratory: Breathing nonlabored  Cardiovascular: Regular rate and rhythm  GI: Abdomen soft, nondistended, tender in RUQ and central abdomen without rebound or guarding  Musculoskeletal: No gross deformities  Neurologic: Grossly nonfocal exam  Psychiatric: Normal mood, affect and insight  Integumentary: Warm and dry         Past Medical History:   No medical conditions.         Past Surgical History:   [place] teeth removal         Current Medications:           piperacillin-tazobactam  3.375 g Intravenous Q6H     sodium chloride (PF)  3 mL Intracatheter Q8H       acetaminophen **OR** acetaminophen, HYDROmorphone, HYDROmorphone, HYDROmorphone, lidocaine 4%, lidocaine (buffered or not buffered), melatonin, ondansetron **OR** ondansetron, ondansetron, oxyCODONE, oxyCODONE IR, senna-docusate **OR** senna-docusate, sodium chloride  (PF)         Home Medications:     Prior to Admission medications    Not on File            Allergies:   No Known Allergies         Family History:   No family history on file.        Social History:   Eddie Carey  reports that he has never smoked. He has never used smokeless tobacco.          Review of Systems:   The 12 point Review of Systems is negative other than noted in the HPI.         Labs/Imaging   All new lab and imaging data was reviewed.   Recent Labs   Lab 02/01/23  0538 01/31/23  2217   WBC 6.7 10.9   HGB 13.9 15.2   HCT 42.7 45.0   MCV 83 81    248     Recent Labs   Lab 02/01/23  0538 01/31/23  2217    141   POTASSIUM 4.2 3.4   CHLORIDE 100 98   CO2 26 29   ANIONGAP 10 14   * 128*   BUN 11.2 12.2   CR 1.02 1.00   GFRESTIMATED >90 >90   ERIK 9.1 9.8   PROTTOTAL 6.6 8.0   ALBUMIN 4.1 5.0   BILITOTAL 1.2 0.4   ALKPHOS 88 91   * 42   * 39       I have personally reviewed the imaging studies-     US ABDOMEN 1/31  IMPRESSION:  1.  Cholelithiasis and biliary sludge. Mild gallbladder wall thickening appears slightly more pronounced and there is now a positive sonographic Lion's sign with tenderness noted over the gallbladder.  2.  Mild dilatation of extrahepatic bile duct now present.    US ABDOMEN 1/13  IMPRESSION:  1.  Cholelithiasis with borderline gallbladder wall thickening  measuring 4 mm. Findings could represent early acute cholecystitis.  Consider HIDA scan for further evaluation.  2.  Mild hepatic steatosis.      Eneida Toledo PA-C    80 minutes spent on date of the encounter doing patient visit, chart review, and documentation.

## 2023-02-01 NOTE — ANESTHESIA CARE TRANSFER NOTE
Patient: Eddie Carey    Procedure: Procedure(s):  laparoscopic cholecystectomy       Diagnosis: Acute cholecystitis [K81.0]  Diagnosis Additional Information: No value filed.    Anesthesia Type:   General     Note:    Oropharynx: oropharynx clear of all foreign objects  Level of Consciousness: awake  Oxygen Supplementation: room air    Independent Airway: airway patency satisfactory and stable  Dentition: dentition unchanged  Vital Signs Stable: post-procedure vital signs reviewed and stable  Report to RN Given: handoff report given  Patient transferred to: PACU    Handoff Report: Identifed the Patient, Identified the Reponsible Provider, Reviewed the pertinent medical history, Discussed the surgical course, Reviewed Intra-OP anesthesia mangement and issues during anesthesia, Set expectations for post-procedure period and Allowed opportunity for questions and acknowledgement of understanding      Vitals:  Vitals Value Taken Time   BP     Temp     Pulse 98    Resp 12    SpO2 96        Electronically Signed By: MELVIN Garcia CRNA  February 1, 2023  5:01 PM

## 2023-02-01 NOTE — ED TRIAGE NOTES
[facility]  ED Arrival Note    Arrives through triage. ABC's intact. A &O X4. . Pt arrives with c/o Lower abdominal pain 10/10 starting 20 mins ago. Appears pale and uncomfortable. Hx of gallbladder problems, upcoming cholecystectomy in March. No tenders in the upper abdomen noted in triage.       Visitors during triage: Spouse, Leah      Triage Interventions: Direct rooming     Ambulatory: Yes    Meets Stroke Criteria?: No    Meets Trauma Criteria?: No    Shock Index: N/A, for provider reference    Directed to: Main ED    Pronouns: he/him     Triage Assessment     Row Name 01/31/23 2154       Triage Assessment (Adult)    Airway WDL WDL       Respiratory WDL    Respiratory WDL WDL       Skin Circulation/Temperature WDL    Skin Circulation/Temperature WDL WDL       Cardiac WDL    Cardiac WDL WDL       Peripheral/Neurovascular WDL    Peripheral Neurovascular WDL WDL       Cognitive/Neuro/Behavioral WDL    Cognitive/Neuro/Behavioral WDL WDL    Row Name 01/31/23 2151       Triage Assessment (Adult)    Airway WDL WDL       Respiratory WDL    Respiratory WDL WDL       Skin Circulation/Temperature WDL    Skin Circulation/Temperature WDL WDL       Cardiac WDL    Cardiac WDL WDL       Peripheral/Neurovascular WDL    Peripheral Neurovascular WDL WDL       Cognitive/Neuro/Behavioral WDL    Cognitive/Neuro/Behavioral WDL WDL

## 2023-02-01 NOTE — DISCHARGE INSTRUCTIONS
[facility] - [facility]ULT[facility]  Discharge Instructions: Post-Operative Cholecystectomy    ACTIVITY  Expect to feel tired after your surgery.  This will gradually resolve.    Take frequent, short walks and increase your activity gradually.    Avoid strenuous physical activity or heavy lifting greater than 15-20 lbs. for 2-3 weeks.  You may climb stairs.  You may drive without restrictions when you are not using any prescription pain medication and feel comfortable in a car.  You may return to work/school when you are comfortable without any prescription pain medication.    WOUND CARE  You may remove your outer dressing or Band-Aids and shower 48 hours after the surgery.  Pat your incisions dry and leave them open to air.  Re-apply dressing (Band-Aids or gauze/tape) as needed for comfort or drainage.  You may have steri-strips (looks like white tape) on your incision.  You may peel off the steri-strips 2 weeks after your surgery if they have not peeled off on their own.  Do not soak your incisions in a tub or pool for 2 weeks.   Do not apply any lotions, creams, or ointments to your incisions.  A ridge under your incisions is normal and will gradually resolve.    DIET  Start with liquids, then gradually resume your regular diet as tolerated.  Avoid heavy, spicy, and greasy meals for 2-3 days.  Drink plenty of fluids to stay hydrated.  It is not uncommon to experience some loose stools or diarrhea after surgery.  This is your body's way of adapting to the bile which will slowly drain into your intestine.  A low fat diet may help with this.  This should improve over 1-2 months.    PAIN  Expect some tenderness and discomfort at the incision sites.  Use the prescribed pain medication at your discretion.  Expect gradual resolution of your pain over several days.  You may take ibuprofen with food (unless you have been told not to) or acetaminophen/Tylenol instead of or in addition to your prescribed pain  medication.  However, if you are taking Norco do not take any additional acetaminophen/Tylenol.  Do not drink alcohol or drive while you are taking pain medications.  You may apply ice to your incisions in 20 minute intervals as needed for the next 48 hours.  After that time, consider switching to heat if you prefer.    EXPECTATIONS  Pain medications can cause constipation.  Limit use when possible.  Take an over the counter or prescribed stool softener/stimulant, such as Colace or Senna, 1-2 times a day with plenty of water.  You may take a mild over the counter laxative, such as Miralax or a suppository, as needed.  You may discontinue these medications once you are having regular bowel movements and/or are no longer taking your narcotic pain medication.    You may have shoulder or upper back discomfort due to the gas used in surgery.  This is temporary and should resolve in 48-72 hours.  Short, frequent walks may help with this.  If you are unable to urinate for 8 hours or feel as though you are not emptying your bladder adequately, we recommend you seek care at an ER or Urgent Care facility for possible catheter placement.     FOLLOW UP  Our office will contact you in approximately 2-3 weeks to check on your progress and answer any questions you may have.  If you are doing well, you will not need to return for a follow up appointment.  If any concerns are identified over the phone, we will help you make an appointment to see a provider.   If you have not received a phone call, have any questions or concerns, or would like to be seen, please call us at [phone] and ask to speak with our nurse.  We are located at [address] [place] [address].    CALL OUR OFFICE [phone] IF YOU HAVE:   Chills or fever above 101 F.  Increased redness, warmth, or drainage at your incisions.  Significant bleeding.  Pain not relieved by your pain medication or rest.  Increasing pain after the first 48  hours.  Any other concerns or questions.

## 2023-02-01 NOTE — ED TRIAGE NOTES
Mid abd pain: he thinks this is from his gallbladder.     Triage Assessment     Row Name 01/31/23 9136       Triage Assessment (Adult)    Airway WDL WDL       Respiratory WDL    Respiratory WDL WDL       Skin Circulation/Temperature WDL    Skin Circulation/Temperature WDL WDL       Cardiac WDL    Cardiac WDL WDL       Peripheral/Neurovascular WDL    Peripheral Neurovascular WDL WDL       Cognitive/Neuro/Behavioral WDL    Cognitive/Neuro/Behavioral WDL WDL

## 2023-02-01 NOTE — H&P
Admitted: 01/31/2023    CHIEF COMPLAINT:  Abdominal pain.    HISTORY OF PRESENT ILLNESS:  Has been obtained from the patient, who is a good historian.  I did discuss with ER attending, Dr. Jsutice and I reviewed recent outpatient surgery notes.    Mr. Eddie Carey and is a very pleasant 22-year-old male with a past medical history of gallstones, who presented for evaluation of abdominal pain.  It seems that the patient had episodes of intermittent abdominal pain in the last few months.  He was diagnosed with cholelithiasis.  He did see Dr. Morelos recently on 01/24/2023 and an outpatient laparoscopic cholecystectomy was scheduled for 03/03/2023.    He states that yesterday he ate a snack and after that he started having severe abdominal pain located in the mid abdomen, described as a sharp pain, 10/10 intensity, nonradiating.  He states that this pain was more severe than the pain that he experienced in the past.  He reported some associated nausea and one episode of emesis after he arrived in ER.  He denies any recent fevers.  He denies chest pain, no shortness of breath.  He denies headache.  No dizziness.  No diarrhea, no constipation, no leg swellings.    In ER, he was seen by Dr. Justice his vitals showed a blood pressure of 166/95 later on improved to 133/85, heart rate was 64, respiratory rate 22, oxygen saturation 98% on room air and temperature 99.7.      He did have basic blood work, which showed unremarkable BMP with a sodium of 141, potassium 3.4, chloride 98, bicarbonate 29, BUN 12.2, creatinine 1, Calcium 9.8, anion gap of 14, albumin 5, total protein 8, alkaline phosphatase 91, ALT 39, AST 42, total bilirubin was 0.4, glucose 128, lipase 40.  CBC with white blood cells of 10.9, hemoglobin 15.2, hematocrit 45 and platelet count 248.  His UA was negative for nitrites, negative for leukocyte esterase.      He had another ultrasound of his abdomen, which showed tone and sludge present with modest  gallbladder wall thickening measuring 5-6 mm.  There is a mild dilation of the extrahepatic bile duct.    In ER, he received a bolus of normal saline, 1 dose of IV Dilaudid 0.5 mg and IV Toradol and 1 dose of IV Zofran.  ER attending discussed with surgery on call, who recommended admission by Hospitalist Service.    PAST MEDICAL HISTORY:  Cholelithiasis.    PAST SURGICAL HISTORY:  [place] teeth removal.    SOCIAL HISTORY:  He reports that drinking 2 alcoholic beverages weekly.  He denies smoking.  He denies illicit drug abuse.    FAMILY HISTORY:  Reviewed with the patient and is noncontributory to current admission.    HYJML-OR-YYARIENUC MEDICATIONS:  Needs to be verified by the pharmacy, but apparently he is not on any prescribed medications.    ALLERGIES:  NO KNOWN DRUG ALLERGIES.    REVIEW OF SYSTEMS:  A 10-point review of systems was conducted and it was negative except for pertinent positives mentioned in history of present illness.    PHYSICAL EXAMINATION:    VITAL SIGNS:  Blood pressure is 133/85, heart rate 80, respiratory rate 20, oxygen saturation 98% on room air, temperature 99.7.  GENERAL:  The patient is awake, alert, no acute distress at the time of my examination.  HEENT:  Normocephalic, atraumatic.  Pupils are equally round and reactive to light.  Oral mucosa is moist.  NECK:  Supple, no cervical lymphadenopathy, no thyromegaly.  CHEST:  There is bilateral air entry.  No wheezing.  No rales, no crackles.  CARDIOVASCULAR:  There is normal S1 and S2, regular rate and rhythm.  No murmurs, no rubs.  ABDOMEN:  Soft, nontender, nondistended.  Bowel sounds are present.  EXTREMITIES:  There is no leg, no calf tenderness, 2+ peripheral pulses are palpable.  SKIN:  Intact.  No rash, no cyanosis.  NEUROLOGIC:  The patient is awake, alert, oriented to self, place and no neurological deficits.  PSYCHIATRIC:  Normal mood, normal affect.  MUSCULOSKELETAL:  He moves all extremities freely.  There are no obvious  joint deformities.    LABORATORY DATA:  Reviewed and dictated above.    IMPRESSION AND PLAN:  Mr. Any Carey is a very pleasant 22-year-old gentleman with past medical history of cholelithiasis, who presented to ER for evaluation of abdominal pain.    PLAN:    1.  Symptomatic cholelithiasis.  He reports intermittent episodes of abdominal pain for the last few months.  He did have an ultrasound of the abdomen on 2023, which showed cholelithiasis with some gallbladder wall thickening.  He saw Dr. Morelos as outpatient and he was supposed to have elective laparoscopic cholecystectomy scheduled for 2023.  He presented last evening for evaluation of severe abdominal pain.  His blood work is unremarkable.  Ultrasound of the abdomen showed again cholelithiasis and biliary sludge and some mild dilation of the extrahepatic bile duct.  He does not have fever, no leukocytosis.  He was given some IV Dilaudid and IV Toradol in ER, his pain improved.  Surgery on call was contacted.  They are planning to take him to OR tomorrow.  Plan for now is to keep him n.p.o.  We will start him on IV fluids, pain medications, antiemetics p.r.n.  A formal surgery consult requested.  We will keep him n.p.o. for now as I anticipate that he will go to OR in the morning.    DEEP VENOUS THROMBOSIS PROPHYLAXIS:  Encourage ambulation, as I anticipate a short hospital stay.    CODE STATUS:  Discussed with the patient.  The patient is full code.    DISPOSITION:  Admit under observational status.  I anticipate patient may be discharged later on today versus tomorrow, pending timing of surgery and postop course.    Vonda Khalil MD        D: 2023   T: 2023   MT: [id number]    Name:     ANY CAREY  MRN:      [id number]        Account:     [id number]   :      2000           Admitted:    2023       Document: [id number]

## 2023-02-01 NOTE — PROGRESS NOTES
Observation goals PRIOR TO DISCHARGE  Comments: -diagnostic tests and consults completed and resulted - NOT MET - emma riddle scheduled for this afternoon  -vital signs normal or at patient baseline - MET  /78   Pulse 89   Temp 98.3  F (36.8  C) (Oral)   Resp 18   Ht 1.829 m (6')   Wt 81.6 kg (180 lb)   SpO2 99%   BMI 24.41 kg/m    -adequate pain control on oral analgesics - MET

## 2023-02-01 NOTE — UTILIZATION REVIEW
"[facility] Utilization Review  Admission Status; Secondary Review Determination     Admission Date: 1/31/2023  9:53 PM      Under the authority of the Utilization Management Committee, the utilization review process indicated a secondary review on the above patient.  The review outcome is based on review of the medical records, discussions with staff, and applying clinical experience noted on the date of the review.        (X) Observation Status Appropriate - This patient does not meet hospital inpatient criteria and is placed in observation status. If this patient's primary payer is Medicare and was admitted as an inpatient, Condition Code 44 should be used and patient status changed to \"observation\".   () Observation Status concurrent Review           RATIONALE FOR DETERMINATION   22-year-old male admitted with acute symptomatic cholecystitis, with recent ultrasound showing cholelithiasis with gallbladder wall thickening.  Patient also found to have mild dilation of extrahepatic bile duct, started on IV Zosyn, antiemetics and pain control with IV Dilaudid.  Patient has elevated LFTs, no fevers or tachycardia, plan for laparoscopic cholecystectomy today.  Young patient with acute symptomatic cholecystitis, with plan for surgery today, does not meet criteria for inpatient stay, recommend continue observation status with plans for early discharge after surgery      The severity of illness, intensity of service provided, expected LOS make the care appropriate for observation status at this time.        The information on this document is developed by the utilization review team in order for the business office to ensure compliance.  This only denotes the appropriateness of proper admission status and does not reflect the quality of care rendered.              Sincerely,       Isis Foster MD  Physician Advisor  Utilization Review-[place]    Phone: [phone]       "

## 2023-02-01 NOTE — H&P
[facility]    History and Physical - Hospitalist Service       Date of Admission:  1/31/2023  Dictation #: [id number]  Brief Summary (see dictation for more details): Symptomatic cholelithiasis- npo, iv fluids, pain management, antiemetics prn, Surgery consult.     Clinically Significant Risk Factors Present on Admission                               Medical Decision Making       MANAGEMENT DISCUSSED with the following over the past 24 hours: ER attending   NOTE(S)/MEDICAL RECORDS REVIEWED over the past 24 hours: outpatient surgery notes  Tests ORDERED & REVIEWED in the past 24 hours:  - BMP  - CBC  - LFTs  - Lipase  Tests personally interpreted in the past 24 hours:  - ULTRASOUND showing cholelithiasis with sludge, mildly dilated extrahepatic biliary ducts      Vonda Khalil MD  Hospitalist Service  [facility]  Securely message with [employer] (more info)  Text page via [employer] Paging/Directory

## 2023-02-01 NOTE — PROGRESS NOTES
RECEIVING UNIT ED HANDOFF REVIEW    ED Nurse Handoff Report was reviewed by: Nel Larios RN on February 1, 2023 at 8:00 AM

## 2023-02-01 NOTE — PROGRESS NOTES
Observation goals PRIOR TO DISCHARGE  Comments: -diagnostic tests and consults completed and resulted NOT MET - emma riddle scheduled for this afternoon  -vital signs normal or at patient baseline - NOT MET   BP (!) 141/88 (BP Location: Left arm)   Pulse 83   Temp 98.3  F (36.8  C) (Oral)   Resp 18   Ht 1.829 m (6')   Wt 81.6 kg (180 lb)   SpO2 99%   BMI 24.41 kg/m    -adequate pain control on oral analgesics - MET - pt currently denying pain

## 2023-02-01 NOTE — PROGRESS NOTES
[facility]    Medicine Progress Note - Hospitalist Service    Date of Admission:  1/31/2023    Assessment & Plan   Patient is a 22 year old male with acute cholecystitis and possible choledocholithiasis given transaminitis, elevated tBili, and extrahepatic biliary dilatation.     Symptomatic cholelithiasis  -consult general surgery   -NPO   -PPx Zosyn  -IVF  -Antiemetics and analgesia prn.  -Proceed with laparoscopic cholecystectomy with possible intraoperative cholangiogram today.    transaminitis  -will consider GI consult if LFT worsen  -trend level        Diet: NPO per Anesthesia Guidelines for Procedure/Surgery Except for: Meds    DVT Prophylaxis: Ambulate every shift  Aleman Catheter: Not present  Lines: None     Cardiac Monitoring: None  Code Status: Full Code      Clinically Significant Risk Factors Present on Admission                               Disposition Plan      Expected Discharge Date: 02/01/2023,  6:00 PM      Discharge Comments: Debo riddle.        The patient's care was discussed with the Attending Physician, Dr. Valdez .    Conor Temple, CNP  Hospitalist Service  [facility]  Securely message with [employer])  Text page via [employer] Paging/Directory   ______________________________________________________________________    Interval History   -surgery today     Physical Exam   Vital Signs: Temp: 98.3  F (36.8  C) Temp src: Oral BP: 136/78 Pulse: 89   Resp: 18 SpO2: 99 % O2 Device: None (Room air)    Weight: 180 lbs 0 oz    GENERAL:  The patient is awake, alert, no acute distress at the time of my examination.  HEENT:  Normocephalic, atraumatic.  Pupils are equally round and reactive to light.  Oral mucosa is moist.  NECK:  Supple, no cervical lymphadenopathy, no thyromegaly.  CHEST:  There is bilateral air entry.  No wheezing.  No rales, no crackles.  CARDIOVASCULAR:  There is normal S1 and S2, regular rate and rhythm.  No murmurs, no  rubs.  ABDOMEN:  Soft, nontender, nondistended.  Bowel sounds are present.  EXTREMITIES:  There is no leg, no calf tenderness, 2+ peripheral pulses are palpable.  SKIN:  Intact.  No rash, no cyanosis.  NEUROLOGIC:  The patient is awake, alert, oriented to self, place and no neurological deficits.  PSYCHIATRIC:  Normal mood, normal affect.  MUSCULOSKELETAL:  He moves all extremities freely.  There are no obvious joint deformities    Medical Decision Making       25 MINUTES SPENT BY ME on the date of service doing chart review, history, exam, documentation & further activities per the note.  MANAGEMENT DISCUSSED with the following over the past 24 hours: patient and Dr. Valdez       Data     I have personally reviewed the following data over the past 24 hrs:    6.7  \   13.9   / 170     136 100 11.2 /  105 (H)   4.2 26 1.02 \       ALT: 203 (H) AST: 290 (H) AP: 88 TBILI: 1.2   ALB: 4.1 TOT PROTEIN: 6.6 LIPASE: 40       Imaging results reviewed over the past 24 hrs:   Recent Results (from the past 24 hour(s))   US Abdomen Limited (RUQ)    Narrative    EXAM: US ABDOMEN LIMITED  LOCATION: [facility]  DATE/TIME: 1/31/2023 11:42 PM    INDICATION: RUQ pain  COMPARISON: 1/13/2023  TECHNIQUE: Limited abdominal ultrasound.    FINDINGS:    GALLBLADDER: Stones and sludge present with modest gallbladder wall thickening measuring 5-6 mm. Tenderness now present over the gallbladder during exam, positive sonographic Lion's sign.    BILE DUCTS: Mild extrahepatic bile duct dilatation The common duct measures 7 mm.    LIVER: Normal parenchyma with smooth contour. No focal mass.    RIGHT KIDNEY: No hydronephrosis.    PANCREAS: The visualized portions are normal.    No ascites.      Impression    IMPRESSION:  1.  Cholelithiasis and biliary sludge. Mild gallbladder wall thickening appears slightly more pronounced and there is now a positive sonographic Lion's sign with tenderness noted over the  gallbladder.  2.  Mild dilatation of extrahepatic bile duct now present.

## 2023-02-01 NOTE — ED PROVIDER NOTES
History     Chief Complaint:  Abdominal Pain       HPI   Eddie Carey is a 22 year old male with a history of cholelithiasis scheduled for a cholecystectomy on March 3 who presents with abdominal pain.  He states this feels consistent with previous episodes of biliary colic.  He states the pain is located centrally, he is got associated nausea and vomiting and has been diaphoretic but denies fevers.  He states that he has been monitoring his diet and had a small amount of fat today but not a significant amount.  He denies any urinary symptoms.  There are no further aggravating or alleviating factors no further associated symptoms.      Independent Historian: Patient provides his own history, he presents with his wife      Review of External Notes: Previous emergency department notes, ultrasound, and CT scan were reviewed    ROS:  Review of Systems   Constitutional: Negative for fever.   Gastrointestinal: Positive for abdominal pain, nausea and vomiting.   All other systems reviewed and are negative.      Allergies:  No Known Allergies     Medications:    No current outpatient medications on file.      Past Medical History:    Cholelithiasis    Past Surgical History:    No past surgical history on file.     Family History:    Noncontributory    Social History:   reports that he has never smoked. He has never used smokeless tobacco.  Presents with his wife    Physical Exam   Patient Vitals for the past 24 hrs:   BP Temp Temp src Pulse Resp SpO2 Height Weight   01/31/23 2155 -- -- -- -- -- 98 % -- --   01/31/23 2154 (!) 166/95 99.7  F (37.6  C) Temporal 64 22 -- -- --   01/31/23 2153 -- -- -- -- -- -- 1.829 m (6') 81.6 kg (180 lb)        Physical Exam  General: Alert, interactive in mild to moderate distress  Head:  Scalp is atraumatic  Eyes:  The pupils are equal, round, and reactive to light    EOM's intact    No scleral icterus  ENT:      Nose:  The external nose is normal  Ears:  External ears are  normal  Mouth/Throat: The oropharynx is normal    Mucus membranes are dry      Neck:  Normal range of motion.      There is no rigidity.    Trachea is in the midline         CV:  Regular rate and rhythm    No murmur   Resp:  Breath sounds are clear bilaterally    Non-labored, no retractions or accessory muscle use      GI:  Abdomen is soft, no distension, diffuse tenderness.       MS:  Normal strength in all 4 extremities  Skin:  Warm and dry, No rash or lesions noted.  Neuro: Strength 5/5 x4.   Psych:  Awake. Alert.  Normal affect.      Appropriate interactions.    Emergency Department Course     Imaging:  US Abdomen Limited (RUQ)   Final Result   IMPRESSION:   1.  Cholelithiasis and biliary sludge. Mild gallbladder wall thickening appears slightly more pronounced and there is now a positive sonographic Lion's sign with tenderness noted over the gallbladder.   2.  Mild dilatation of extrahepatic bile duct now present.               Report per radiology    Laboratory:  Labs Ordered and Resulted from Time of ED Arrival to Time of ED Departure   BASIC METABOLIC PANEL - Abnormal       Result Value    Sodium 141      Potassium 3.4      Chloride 98      Carbon Dioxide (CO2) 29      Anion Gap 14      Urea Nitrogen 12.2      Creatinine 1.00      Calcium 9.8      Glucose 128 (*)     GFR Estimate >90     ROUTINE UA WITH MICROSCOPIC REFLEX TO CULTURE - Abnormal    Color Urine Yellow      Appearance Urine Slightly Cloudy (*)     Glucose Urine Negative      Bilirubin Urine Negative      Ketones Urine Negative      Specific Gravity Urine 1.027      Blood Urine Negative      pH Urine 6.5      Protein Albumin Urine 10 (*)     Urobilinogen Urine Normal      Nitrite Urine Negative      Leukocyte Esterase Urine Negative      Mucus Urine Present (*)     Amorphous Crystals Urine Few (*)     RBC Urine <1      WBC Urine 1     DIFFERENTIAL - Abnormal    % Neutrophils 38      % Lymphocytes 53      % Monocytes 6      % Eosinophils 1       % Basophils 1      % Myelocytes 1      Absolute Neutrophils 4.1      Absolute Lymphocytes 5.8 (*)     Absolute Monocytes 0.7      Absolute Eosinophils 0.1      Absolute Basophils 0.1      Absolute Myelocytes 0.1 (*)     RBC Morphology Confirmed RBC Indices      Platelet Assessment        Value: Automated Count Confirmed. Platelet morphology is normal.   CBC WITH PLATELETS AND DIFFERENTIAL - Normal    WBC Count 10.9      RBC Count 5.53      Hemoglobin 15.2      Hematocrit 45.0      MCV 81      MCH 27.5      MCHC 33.8      RDW 12.0      Platelet Count 248     HEPATIC FUNCTION PANEL - Normal    Protein Total 8.0      Albumin 5.0      Bilirubin Total 0.4      Alkaline Phosphatase 91      AST 42      ALT 39      Bilirubin Direct <0.20     LIPASE - Normal    Lipase 40        Emergency Department Course & Assessments:    Interventions:  Medications   0.9% sodium chloride BOLUS (1,000 mLs Intravenous New Bag 1/31/23 2216)   ondansetron (ZOFRAN) injection 4 mg (4 mg Intravenous Given 1/31/23 2220)   HYDROmorphone (PF) (DILAUDID) injection 0.5 mg (has no administration in time range)   ketorolac (TORADOL) injection 30 mg (30 mg Intravenous Given 1/31/23 2216)        Independent Interpretation (X-rays, CTs, rhythm strip):  Gallbladder ultrasound was reviewed    Consultations/Discussion of Management or Tests:  0028 I spoke with Dr. Reza, surgery, regarding the patient.  0038 I spoke with Dr. Khalil, hospitalist, regarding the patient.       Disposition:  The patient was admitted to the hospital under the care of Dr. Khalil.     Impression & Plan    Medical Decision Making:  Following presentation history and physical examination were performed, prior records reviewed.  The above work-up was undertaken.  Findings are consistent with acute cholecystitis, there is no signs of choledocho cholelithiasis, cholangitis, sepsis syndrome, or more concerning illness.  This pain was managed with the medications above and he was  feeling improved.  Given the recurrent nature of his symptoms I believe he benefit from hospitalization for cholecystectomy.  I spoke with the on-call surgeon who is in agreement.  Patient will be admitted to the hospitalist service for further evaluation and treatment pending operative intervention tomorrow.    Diagnosis:    ICD-10-CM    1. Acute cholecystitis  K81.0            Scribe Disclosure:  I, Nat Ricki, am serving as a scribe at 12:44 AM on 2/1/2023 to document services personally performed by Rudy Justice, based on my observations and the provider's statements to me.   1/31/2023   Rudy Justice,*      Rudy Justice MD  02/01/23 0045

## 2023-02-01 NOTE — ANESTHESIA PREPROCEDURE EVALUATION
Anesthesia Pre-Procedure Evaluation    Patient: Eddie Carey   MRN: [id number] : 2000        Procedure : Procedure(s):  laparoscopic cholecystectomy          No past medical history on file.   No past surgical history on file.   No Known Allergies   Social History     Tobacco Use     Smoking status: Never     Smokeless tobacco: Never   Substance Use Topics     Alcohol use: Not on file      Wt Readings from Last 1 Encounters:   23 81.6 kg (180 lb)        Anesthesia Evaluation   Pt has had prior anesthetic.     No history of anesthetic complications       ROS/MED HX  ENT/Pulmonary:    (-) tobacco use and sleep apnea   Neurologic:    (-) no seizures and no CVA   Cardiovascular:    (-) hypertension   METS/Exercise Tolerance:     Hematologic:       Musculoskeletal:       GI/Hepatic:     (+) cholecystitis/cholelithiasis,  (-) GERD and liver disease   Renal/Genitourinary:    (-) renal disease   Endo:    (-) Type II DM and thyroid disease   Psychiatric/Substance Use:       Infectious Disease:       Malignancy:       Other:            Physical Exam    Airway        Mallampati: II   TM distance: > 3 FB   Neck ROM: full   Mouth opening: > 3 cm    Respiratory Devices and Support         Dental       (+) Minor Abnormalities - some fillings, tiny chips      Cardiovascular   cardiovascular exam normal          Pulmonary   pulmonary exam normal                OUTSIDE LABS:  CBC:   Lab Results   Component Value Date    WBC 6.7 2023    WBC 10.9 2023    HGB 13.9 2023    HGB 15.2 2023    HCT 42.7 2023    HCT 45.0 2023     2023     2023     BMP:   Lab Results   Component Value Date     2023     2023    POTASSIUM 4.2 2023    POTASSIUM 3.4 2023    CHLORIDE 100 2023    CHLORIDE 98 2023    CO2 26 2023    CO2 29 2023    BUN 11.2 2023    BUN 12.2 2023    CR 1.02 2023    CR 1.00  01/31/2023     (H) 02/01/2023     (H) 01/31/2023     COAGS: No results found for: PTT, INR, FIBR  POC: No results found for: BGM, HCG, HCGS  HEPATIC:   Lab Results   Component Value Date    ALBUMIN 4.1 02/01/2023    PROTTOTAL 6.6 02/01/2023     (H) 02/01/2023     (H) 02/01/2023    ALKPHOS 88 02/01/2023    BILITOTAL 1.2 02/01/2023     OTHER:   Lab Results   Component Value Date    ERIK 9.1 02/01/2023    LIPASE 40 01/31/2023       Anesthesia Plan    ASA Status:  1   NPO Status:  NPO Appropriate    Anesthesia Type: General.     - Airway: ETT   Induction: Intravenous.   Maintenance: Balanced.        Consents    Anesthesia Plan(s) and associated risks, benefits, and realistic alternatives discussed. Questions answered and patient/representative(s) expressed understanding.    - Discussed:     - Discussed with:  Patient         Postoperative Care    Pain management: Multi-modal analgesia.   PONV prophylaxis: Ondansetron (or other 5HT-3), Dexamethasone or Solumedrol, Background Propofol Infusion     Comments:                Nelson Maddox MD

## 2023-02-02 PROBLEM — K80.50 CHOLEDOCHOLITHIASIS: Status: ACTIVE | Noted: 2023-02-02

## 2023-02-02 PROBLEM — G89.18 POSTOPERATIVE PAIN: Status: ACTIVE | Noted: 2023-02-02

## 2023-02-02 PROBLEM — K81.0 ACUTE CHOLECYSTITIS: Status: ACTIVE | Noted: 2023-02-02

## 2023-02-02 LAB
ALBUMIN SERPL BCG-MCNC: 3.9 G/DL (ref 3.5–5.2)
ALBUMIN SERPL BCG-MCNC: 4 G/DL (ref 3.5–5.2)
ALP SERPL-CCNC: 88 U/L (ref 40–129)
ALP SERPL-CCNC: 89 U/L (ref 40–129)
ALT SERPL W P-5'-P-CCNC: 278 U/L (ref 10–50)
ALT SERPL W P-5'-P-CCNC: 278 U/L (ref 10–50)
ANION GAP SERPL CALCULATED.3IONS-SCNC: 12 MMOL/L (ref 7–15)
AST SERPL W P-5'-P-CCNC: 197 U/L (ref 10–50)
AST SERPL W P-5'-P-CCNC: 200 U/L (ref 10–50)
BILIRUB DIRECT SERPL-MCNC: 3.32 MG/DL (ref 0–0.3)
BILIRUB DIRECT SERPL-MCNC: 3.32 MG/DL (ref 0–0.3)
BILIRUB SERPL-MCNC: 4.1 MG/DL
BUN SERPL-MCNC: 5.6 MG/DL (ref 6–20)
CALCIUM SERPL-MCNC: 8.9 MG/DL (ref 8.6–10)
CHLORIDE SERPL-SCNC: 103 MMOL/L (ref 98–107)
CREAT SERPL-MCNC: 1.06 MG/DL (ref 0.67–1.17)
DEPRECATED HCO3 PLAS-SCNC: 23 MMOL/L (ref 22–29)
GFR SERPL CREATININE-BSD FRML MDRD: >90 ML/MIN/1.73M2
GLUCOSE SERPL-MCNC: 93 MG/DL (ref 70–99)
POTASSIUM SERPL-SCNC: 3.8 MMOL/L (ref 3.4–5.3)
PROT SERPL-MCNC: 6.5 G/DL (ref 6.4–8.3)
PROT SERPL-MCNC: 6.6 G/DL (ref 6.4–8.3)
SODIUM SERPL-SCNC: 138 MMOL/L (ref 136–145)

## 2023-02-02 PROCEDURE — 250N000011 HC RX IP 250 OP 636: Performed by: SURGERY

## 2023-02-02 PROCEDURE — 36415 COLL VENOUS BLD VENIPUNCTURE: CPT | Performed by: NURSE PRACTITIONER

## 2023-02-02 PROCEDURE — 120N000001 HC R&B MED SURG/OB

## 2023-02-02 PROCEDURE — 82248 BILIRUBIN DIRECT: CPT | Performed by: NURSE PRACTITIONER

## 2023-02-02 PROCEDURE — 82248 BILIRUBIN DIRECT: CPT | Performed by: SURGERY

## 2023-02-02 PROCEDURE — 250N000013 HC RX MED GY IP 250 OP 250 PS 637: Performed by: INTERNAL MEDICINE

## 2023-02-02 PROCEDURE — G0378 HOSPITAL OBSERVATION PER HR: HCPCS

## 2023-02-02 PROCEDURE — 250N000013 HC RX MED GY IP 250 OP 250 PS 637: Performed by: NURSE PRACTITIONER

## 2023-02-02 PROCEDURE — 96376 TX/PRO/DX INJ SAME DRUG ADON: CPT

## 2023-02-02 PROCEDURE — 80053 COMPREHEN METABOLIC PANEL: CPT | Performed by: NURSE PRACTITIONER

## 2023-02-02 PROCEDURE — 258N000003 HC RX IP 258 OP 636: Performed by: INTERNAL MEDICINE

## 2023-02-02 PROCEDURE — 99231 SBSQ HOSP IP/OBS SF/LOW 25: CPT | Performed by: NURSE PRACTITIONER

## 2023-02-02 RX ORDER — TAMSULOSIN HYDROCHLORIDE 0.4 MG/1
0.4 CAPSULE ORAL DAILY
Status: DISCONTINUED | OUTPATIENT
Start: 2023-02-02 | End: 2023-02-04 | Stop reason: HOSPADM

## 2023-02-02 RX ADMIN — ACETAMINOPHEN 650 MG: 325 TABLET, FILM COATED ORAL at 03:11

## 2023-02-02 RX ADMIN — ACETAMINOPHEN 650 MG: 325 TABLET, FILM COATED ORAL at 15:29

## 2023-02-02 RX ADMIN — SODIUM CHLORIDE: 9 INJECTION, SOLUTION INTRAVENOUS at 06:43

## 2023-02-02 RX ADMIN — PIPERACILLIN AND TAZOBACTAM 3.38 G: 3; .375 INJECTION, POWDER, FOR SOLUTION INTRAVENOUS at 06:28

## 2023-02-02 RX ADMIN — SODIUM CHLORIDE: 9 INJECTION, SOLUTION INTRAVENOUS at 17:54

## 2023-02-02 RX ADMIN — ACETAMINOPHEN 650 MG: 325 TABLET, FILM COATED ORAL at 09:13

## 2023-02-02 RX ADMIN — PIPERACILLIN AND TAZOBACTAM 3.38 G: 3; .375 INJECTION, POWDER, FOR SOLUTION INTRAVENOUS at 19:50

## 2023-02-02 RX ADMIN — PIPERACILLIN AND TAZOBACTAM 3.38 G: 3; .375 INJECTION, POWDER, FOR SOLUTION INTRAVENOUS at 13:53

## 2023-02-02 RX ADMIN — OXYCODONE HYDROCHLORIDE 5 MG: 5 TABLET ORAL at 08:32

## 2023-02-02 RX ADMIN — TAMSULOSIN HYDROCHLORIDE 0.4 MG: 0.4 CAPSULE ORAL at 15:27

## 2023-02-02 RX ADMIN — ACETAMINOPHEN 650 MG: 325 TABLET, FILM COATED ORAL at 22:19

## 2023-02-02 RX ADMIN — PIPERACILLIN AND TAZOBACTAM 3.38 G: 3; .375 INJECTION, POWDER, FOR SOLUTION INTRAVENOUS at 00:09

## 2023-02-02 RX ADMIN — OXYCODONE HYDROCHLORIDE 2.5 MG: 5 TABLET ORAL at 13:50

## 2023-02-02 ASSESSMENT — ACTIVITIES OF DAILY LIVING (ADL)
ADLS_ACUITY_SCORE: 35

## 2023-02-02 NOTE — PROGRESS NOTES
Pt offered to straight cath or ambulate around the room per charge RN direction. Pt does not c/o discomfort in his bladder. Pt is aware that he has the straight cath ordered and stated he would like to try to ambulate more and continue to urinate. Pt urinates frequently he states.

## 2023-02-02 NOTE — PROGRESS NOTES
Xcoverage: paged by RN because urinary retention, PVR 474cc; straight cathX1 ordered.    Shila Khalil, Hospitalist

## 2023-02-02 NOTE — PLAN OF CARE
Goal Outcome Evaluation:    Shift Note  Neuro: A&O  CV: VSS  Respiratory: RA  GI/: Abdominal discomfort prior to surgery. Intermittent nausea, zofran available  Skin: 3 incisions with steri strips/band-aids  Activity: Independent prior to surgery  Diet: NPO prior to surgery  IV: PIV - IVF 100mL/hr   Drips: n/a  BG: n/a  Pain: 3/10 pain @ incision sites  Other: Debo riddle this afternoon - back up to floor @ [id number]  Consults:  Plan:

## 2023-02-02 NOTE — ANESTHESIA POSTPROCEDURE EVALUATION
Patient: Eddie Carey    Procedure: Procedure(s):  laparoscopic cholecystectomy       Anesthesia Type:  General    Note:     Postop Pain Control: Uneventful            Sign Out: Well controlled pain   PONV: No   Neuro/Psych: Uneventful            Sign Out: Acceptable/Baseline neuro status   Airway/Respiratory: Uneventful            Sign Out: Acceptable/Baseline resp. status   CV/Hemodynamics: Uneventful            Sign Out: Acceptable CV status; No obvious hypovolemia; No obvious fluid overload   Other NRE: NONE   DID A NON-ROUTINE EVENT OCCUR? No           Last vitals:  Vitals Value Taken Time   /78 02/01/23 1815   Temp     Pulse 54 02/01/23 1830   Resp 12 02/01/23 1830   SpO2 99 % 02/01/23 1830   Vitals shown include unvalidated device data.    Electronically Signed By: Nelson Maddox MD  February 1, 2023  6:31 PM

## 2023-02-02 NOTE — PROGRESS NOTES
[facility]    Medicine Progress Note - Hospitalist Service    Date of Admission:  1/31/2023    Assessment & Plan   Patient is a 22 year old male with acute cholecystitis and possible choledocholithiasis given transaminitis, elevated tBili, and extrahepatic biliary dilatation.     Symptomatic cholelithiasis  S/p uncomplicated laparoscopic cholecystectomy 02/01/2023.  POD #1  -consult general surgery   -clear liquids (NPO) until ERCP possibly today  -PPx Zosyn  -IVF  -Antiemetics and analgesia prn.    Elevated bilirubin  Transaminitis  -consulted GI   -Patient needs ERCP     Urinary retention   -Straight cath and bladder scan prn      Diet: Diet  Clear Liquid Diet  NPO per Anesthesia Guidelines for Procedure/Surgery Except for: Meds    DVT Prophylaxis: ambulate every shift  Aleman Catheter: Not present  Lines: None     Cardiac Monitoring: None  Code Status: Full Code      Clinically Significant Risk Factors Present on Admission                               Disposition Plan      Expected Discharge Date: 02/02/2023,  3:00 PM    Destination: home with family  Discharge Comments: Pain control.        The patient's care was discussed with the Attending Physician, Dr. Valdez.    Conor Temple, CNP  Hospitalist Service  [facility]  Securely message with [employer] (more info)  Text page via [employer] Paging/Directory   ______________________________________________________________________    Interval History   -Feeling better this morning. Minimal pain, well controlled on oral medications. Tolerating PO intake without nausea or vomiting. Some urinary retention, hoping ambulation will help him void.  -patient bilirubin higher today 4.1, , 's. Consult GI  -possible ERCP today if not today tomorrow     Physical Exam   Vital Signs: Temp: 98.5  F (36.9  C) Temp src: Oral BP: (!) 146/83 Pulse: 80   Resp: 14 SpO2: 99 % O2 Device: None (Room air) Oxygen Delivery: 2  LPM  Weight: 180 lbs 0 oz    Gen: Awake, alert, NAD   Resp: Equal chest rise bilaterally, breathing comfortably on room air  Abd: Soft, non distended, appropriate incisional tenderness  Ext: WWP  Dressing/Incision: C/d/i w/o erythema/drainage    Medical Decision Making       30 MINUTES SPENT BY ME on the date of service doing chart review, history, exam, documentation & further activities per the note.  MANAGEMENT DISCUSSED with the following over the past 24 hours: patient and Dr. Valdez       Data     I have personally reviewed the following data over the past 24 hrs:    N/A  \   N/A   / N/A     138 103 5.6 (L) /  93   3.8 23 1.06 \       ALT: 278 (H); 278 (H) AST: 197 (H); 200 (H) AP: 89; 88 TBILI: 4.1 (H); 4.1 (H); 4.1 (H)   ALB: 3.9; 4.0 TOT PROTEIN: 6.5; 6.6 LIPASE: N/A       Imaging results reviewed over the past 24 hrs:   No results found for this or any previous visit (from the past 24 hour(s)).

## 2023-02-02 NOTE — PROGRESS NOTES
Goal Outcome Evaluation:     Shift Note  Neuro: A&Ox4  CV: VSS on RA  Respiratory: RA  GI/: Pt c/o having trouble establishing urine stream, but believes he is completely emptying his bladder once going.  Skin: 3 incisions with steri strips/band-aids. Fresh blood occurred at beginning of shift, however stopped bleeding and dried to umbilicus dressing.   Activity: Independent  Diet: Clear liquids, advance as tolerated  IV: PIV - IVF 100mL/hr   Drips: n/a  BG: n/a  Pain: 6/10 pain @ incision sites--PRN Tylenol given, pt would like to avoid opiates if possible.  Other: Pt retaining urine 474... see previous notes regarding this.  Consults: Surgical  Plan: Possible discharge home today? Wife to assist with transportation.

## 2023-02-02 NOTE — CONSULTS
[facility]SULT[facility]     Eddie Carey  [id number] [place] [address]  22 year old male    Admission Date/Time: 1/31/2023  Primary Care Provider: No Ref-Primary, Physician    We were asked to see the patient in consultation by Dr. Gar for evaluation of elevated bilirubin.        HPI:  Eddie Carey is a 22 year old male who was admitted to [facility] 2/1/23 with abdominal pain.  On admission bili was normal, AST and ALT in the 200s. US with gallstones and positive wallace sign.  Mild dilation of the CBD to 7mm.  Patient had cholecystectomy yesterday.  Today, bili is up to 4.  Having some abdominal pain, mostly around incisions.  Feels cold.    No prior abdominal surgeries other than cholecystectomy.    ROS: A comprehensive ten point review of systems was negative aside from those in mentioned in the HPI.      MEDICATIONS: No current facility-administered medications on file prior to encounter.  No current outpatient medications on file prior to encounter.      ALLERGIES: No Known Allergies    No past medical history on file.    Past Surgical History:   Procedure Laterality Date     LAPAROSCOPIC CHOLECYSTECTOMY N/A 2/1/2023    Procedure: laparoscopic cholecystectomy;  Surgeon: Feliz Gar MD;  Location:  OR         SOCIAL HISTORY:  Social History     Tobacco Use     Smoking status: Never     Smokeless tobacco: Never       FAMILY HISTORY:  Grandfather had gallbladder out    PHYSICAL EXAM:   BP (!) 146/83 (BP Location: Left arm)   Pulse 80   Temp 98.5  F (36.9  C) (Oral)   Resp 14   Ht 1.829 m (6')   Wt 81.6 kg (180 lb)   SpO2 99%   BMI 24.41 kg/m      Constitutional: NAD  Cardiovascular: RRR  Respiratory: CTAB  Psychiatric: mentation appears normal and affect normal/bright  Head: Normocephalic. Atraumatic.    Neck:normal size, trachea midline  Eyes:  no icterus  ENT:  hearing adequate  Abdomen:   Auscultation: bowel sounds heard  Appearance: several small incision  sites on abdomen  Palpation: mild tenderness to palpation near incisions, no rebound or guarding  NEURO: grossly negative  SKIN: no suspicious lesions or rashes            ADDITIONAL COMMENTS:   I reviewed the patient's new clinical lab test results.   Recent Labs   Lab Test 02/01/23  0538 01/31/23 2217 01/13/23  1151   WBC 6.7 10.9 5.7   HGB 13.9 15.2 15.3   MCV 83 81 84    248 334     Recent Labs   Lab Test 02/02/23  0657 02/01/23  0538 01/31/23 2217    136 141   POTASSIUM 3.8 4.2 3.4   CHLORIDE 103 100 98   CO2 23 26 29   BUN 5.6* 11.2 12.2   CR 1.06 1.02 1.00   ANIONGAP 12 10 14   ERIK 8.9 9.1 9.8   GLC 93 105* 128*     Recent Labs   Lab Test 02/02/23 0657 02/01/23  0538 01/31/23  2346 01/31/23 2217 01/13/23  1151   ALBUMIN 4.0  3.9 4.1  --  5.0 4.4   BILITOTAL 4.1*  4.1* 1.2  --  0.4 0.4   *  278* 203*  --  39 42   *  197* 290*  --  42 28   ALKPHOS 88  89 88  --  91 73   PROTEIN  --   --  10*  --   --    LIPASE  --   --   --  40 172       US 2/1/23:  1.  Cholelithiasis and biliary sludge. Mild gallbladder wall thickening appears slightly more pronounced and there is now a positive sonographic Lion's sign with tenderness noted over the gallbladder.  2.  Mild dilatation of extrahepatic bile duct now present.      CONSULTATION ASSESSMENT AND PLAN:    Active Problems:  Elevated bilirubin  Assessment:  Patient had cholecystectomy 2/1/22, today bili is higher, some abdominal pain today.  Patient thinks it is mostly around incisions.  May have stone in the common bile duct.    Plan:  Patient needs ERCP, we are working on timing. May not be able to get it on until tomorrow.  I will know more in a couple of hours and will update patient, I asked him to stay npo for the meantime  Continue antibiotics    **Addendum, discussed with biliary provider and ERCP cannot be done until tomorrow.  He can have clears today, NPO after midnight.      Nel Andino MD  [facility]  [facility]  Office:  [phone]    Approximately 35 minutes of total time was spent providing patient care, including patient evaluation, reviewing documentation/test results, and .

## 2023-02-02 NOTE — PROVIDER NOTIFICATION
MD Notification    Notified Person: MD    Notified Person Name: JESSICA MONTGOMERY MD     Notification Date/Time: 2/2/23 @ 0631    Notification Interaction: via [employer]    Purpose of Notification: Pt is retaining urine post-void = 474. No orders to straight cath. Please advise. Thank you!     Orders Received: Straight cath urine x 1    Comments:

## 2023-02-02 NOTE — PLAN OF CARE
Observation goals  PRIOR TO DISCHARGE        Comments: -diagnostic tests and consults completed and resulted not met  -vital signs normal or at patient baseline not met, HTN  -adequate pain control on oral analgesics met  Nurse to notify provider when observation goals have been met and patient is ready for discharge.

## 2023-02-02 NOTE — UTILIZATION REVIEW
Inpatient appropriate    Admission Status; Secondary Review Determination      Under the authority of the Utilization Management Committee, the utilization review process indicated a secondary review on the above patient. The review outcome is based on review of the medical records, discussions with staff, and applying clinical experience noted on the date of the review.    (x) Inpatient Status Appropriate - This patient's medical care is consistent with medical management for inpatient care and reasonable inpatient medical practice.    RATIONALE FOR DETERMINATION  22-year-old male was admitted to [facility] on 1/31/2023 with acute cholecystitis and underwent laparoscopic cholecystectomy on 2/1/2023.  Postoperatively patient has an elevation in bilirubin up to 4.1 with significant direct component.  Concern for choledocholithiasis.  Patient is on broad-spectrum antibiotics.  Gastroenterology has been consulted with plan for ERCP.  Patient meets inpatient criteria at this time.    At the time of admission with the information available to the attending physician more than 2 nights Hospital complex care was anticipated, based on patient risk of adverse outcome if treated as outpatient and complex care required. Inpatient admission is appropriate based on the Medicare guidelines.    This document was produced using voice recognition software      The information on this document is developed by the utilization review team in order for the business office to ensure compliance. This only denotes the appropriateness of proper admission status and does not reflect the quality of care rendered.  The definitions of Inpatient Status and Observation Status used in making the determination above are those provided in the CMS Coverage Manual, Chapter 1 and Chapter 6, section 70.4.    Sincerely,    Utilization Review  Physician Advisor  [facility].

## 2023-02-02 NOTE — PROGRESS NOTES
Surgery Progress Note  2/2/2023     Subjective: Feeling better this morning. Minimal pain, well controlled on oral medications. Tolerating PO intake without nausea or vomiting. Some urinary retention, hoping ambulation will help him void.     Objective:  Temp:  [97.3  F (36.3  C)-99  F (37.2  C)] 98.5  F (36.9  C)  Pulse:  [54-96] 80  Resp:  [11-19] 14  BP: (120-154)/() 146/83  SpO2:  [93 %-99 %] 99 %  I/O last 3 completed shifts:  In: 1800 [P.O.:50; I.V.:1750]  Out: -     Gen: Awake, alert, NAD   Resp: Equal chest rise bilaterally, breathing comfortably on room air  Abd: Soft, non distended, appropriate incisional tenderness  Ext: WWP  Dressing/Incision: C/d/i w/o erythema/drainage    BMPRecent Labs   Lab 02/02/23 0657 02/01/23  0538 01/31/23 2217    136 141   POTASSIUM 3.8 4.2 3.4   CHLORIDE 103 100 98   ERIK 8.9 9.1 9.8   CO2 23 26 29   BUN 5.6* 11.2 12.2   CR 1.06 1.02 1.00   GLC 93 105* 128*     CBC  Recent Labs   Lab 02/01/23  0538 01/31/23 2217   WBC 6.7 10.9   RBC 5.14 5.53   HGB 13.9 15.2   HCT 42.7 45.0   MCV 83 81   MCH 27.0 27.5   MCHC 32.6 33.8   RDW 12.0 12.0    248     AST/ALT & Alk Phos  Recent Labs   Lab 02/02/23  0657 02/01/23  0538 01/31/23 2217   *  197* 290* 42   *  278* 203* 39   ALKPHOS 88  89 88 91     Bili  Recent Labs   Lab Test 02/02/23 0657 02/01/23  0538 01/31/23 2217 01/13/23  1151   BILITOTAL 4.1*  4.1*  4.1* 1.2 0.4 0.4   DBIL 3.32*  3.32* 0.72* <0.20  --      Lipase/Amlyase  Recent Labs   Lab 01/31/23  2217   LIPASE 40       A/P: Eddie LUND Cherry is a 22 year old male with no significant past medical history now s/p uncomplicated laparoscopic cholecystectomy 02/01/2023. Patient with new direct hyperbilirubinemia today.    - GI consult for consideration of ERCP  - OK to continue diet for General Surgery standpoint, will defer to GI pending possible procedure  - Straight cath and bladder scan prn for urinary retention    Anat Silva MD,  [facility] 02/02/23 07:09 AM   General Surgery Resident - PGY4     Never smoker

## 2023-02-03 ENCOUNTER — ANESTHESIA (OUTPATIENT)
Dept: SURGERY | Facility: CLINIC | Age: 23
DRG: 418 | End: 2023-02-03
Payer: COMMERCIAL

## 2023-02-03 ENCOUNTER — APPOINTMENT (OUTPATIENT)
Dept: GENERAL RADIOLOGY | Facility: CLINIC | Age: 23
DRG: 418 | End: 2023-02-03
Attending: INTERNAL MEDICINE
Payer: COMMERCIAL

## 2023-02-03 ENCOUNTER — ANESTHESIA EVENT (OUTPATIENT)
Dept: SURGERY | Facility: CLINIC | Age: 23
DRG: 418 | End: 2023-02-03
Payer: COMMERCIAL

## 2023-02-03 LAB
ALBUMIN SERPL BCG-MCNC: 3.9 G/DL (ref 3.5–5.2)
ALP SERPL-CCNC: 92 U/L (ref 40–129)
ALT SERPL W P-5'-P-CCNC: 214 U/L (ref 10–50)
AST SERPL W P-5'-P-CCNC: 114 U/L (ref 10–50)
BILIRUB DIRECT SERPL-MCNC: 2.4 MG/DL (ref 0–0.3)
BILIRUB SERPL-MCNC: 3.2 MG/DL
ERCP: NORMAL
PROT SERPL-MCNC: 6.7 G/DL (ref 6.4–8.3)
UPPER EUS: NORMAL

## 2023-02-03 PROCEDURE — 272N000001 HC OR GENERAL SUPPLY STERILE: Performed by: INTERNAL MEDICINE

## 2023-02-03 PROCEDURE — 250N000009 HC RX 250: Performed by: INTERNAL MEDICINE

## 2023-02-03 PROCEDURE — 74330 X-RAY BILE/PANC ENDOSCOPY: CPT

## 2023-02-03 PROCEDURE — 36415 COLL VENOUS BLD VENIPUNCTURE: CPT | Performed by: INTERNAL MEDICINE

## 2023-02-03 PROCEDURE — 99232 SBSQ HOSP IP/OBS MODERATE 35: CPT | Performed by: NURSE PRACTITIONER

## 2023-02-03 PROCEDURE — 120N000001 HC R&B MED SURG/OB

## 2023-02-03 PROCEDURE — 258N000003 HC RX IP 258 OP 636: Performed by: ANESTHESIOLOGY

## 2023-02-03 PROCEDURE — C1769 GUIDE WIRE: HCPCS | Performed by: INTERNAL MEDICINE

## 2023-02-03 PROCEDURE — 370N000017 HC ANESTHESIA TECHNICAL FEE, PER MIN: Performed by: INTERNAL MEDICINE

## 2023-02-03 PROCEDURE — 80076 HEPATIC FUNCTION PANEL: CPT | Performed by: INTERNAL MEDICINE

## 2023-02-03 PROCEDURE — 255N000002 HC RX 255 OP 636: Performed by: INTERNAL MEDICINE

## 2023-02-03 PROCEDURE — 250N000011 HC RX IP 250 OP 636: Performed by: SURGERY

## 2023-02-03 PROCEDURE — 999N000141 HC STATISTIC PRE-PROCEDURE NURSING ASSESSMENT: Performed by: INTERNAL MEDICINE

## 2023-02-03 PROCEDURE — 360N000075 HC SURGERY LEVEL 2, PER MIN: Performed by: INTERNAL MEDICINE

## 2023-02-03 PROCEDURE — 250N000013 HC RX MED GY IP 250 OP 250 PS 637: Performed by: NURSE PRACTITIONER

## 2023-02-03 PROCEDURE — 250N000011 HC RX IP 250 OP 636: Performed by: NURSE ANESTHETIST, CERTIFIED REGISTERED

## 2023-02-03 PROCEDURE — C1726 CATH, BAL DIL, NON-VASCULAR: HCPCS | Performed by: INTERNAL MEDICINE

## 2023-02-03 PROCEDURE — 0DJ08ZZ INSPECTION OF UPPER INTESTINAL TRACT, VIA NATURAL OR ARTIFICIAL OPENING ENDOSCOPIC: ICD-10-PCS | Performed by: INTERNAL MEDICINE

## 2023-02-03 PROCEDURE — 250N000009 HC RX 250: Performed by: NURSE ANESTHETIST, CERTIFIED REGISTERED

## 2023-02-03 PROCEDURE — 250N000011 HC RX IP 250 OP 636: Performed by: INTERNAL MEDICINE

## 2023-02-03 PROCEDURE — 0FC98ZZ EXTIRPATION OF MATTER FROM COMMON BILE DUCT, VIA NATURAL OR ARTIFICIAL OPENING ENDOSCOPIC: ICD-10-PCS | Performed by: INTERNAL MEDICINE

## 2023-02-03 PROCEDURE — 250N000011 HC RX IP 250 OP 636: Performed by: ANESTHESIOLOGY

## 2023-02-03 PROCEDURE — 258N000003 HC RX IP 258 OP 636: Performed by: NURSE ANESTHETIST, CERTIFIED REGISTERED

## 2023-02-03 PROCEDURE — 710N000009 HC RECOVERY PHASE 1, LEVEL 1, PER MIN: Performed by: INTERNAL MEDICINE

## 2023-02-03 PROCEDURE — 250N000013 HC RX MED GY IP 250 OP 250 PS 637: Performed by: INTERNAL MEDICINE

## 2023-02-03 PROCEDURE — 258N000003 HC RX IP 258 OP 636: Performed by: INTERNAL MEDICINE

## 2023-02-03 PROCEDURE — 250N000025 HC SEVOFLURANE, PER MIN: Performed by: INTERNAL MEDICINE

## 2023-02-03 RX ORDER — PROPOFOL 10 MG/ML
INJECTION, EMULSION INTRAVENOUS PRN
Status: DISCONTINUED | OUTPATIENT
Start: 2023-02-03 | End: 2023-02-03

## 2023-02-03 RX ORDER — FENTANYL CITRATE 0.05 MG/ML
50 INJECTION, SOLUTION INTRAMUSCULAR; INTRAVENOUS
Status: DISCONTINUED | OUTPATIENT
Start: 2023-02-03 | End: 2023-02-03 | Stop reason: HOSPADM

## 2023-02-03 RX ORDER — SODIUM CHLORIDE, SODIUM LACTATE, POTASSIUM CHLORIDE, CALCIUM CHLORIDE 600; 310; 30; 20 MG/100ML; MG/100ML; MG/100ML; MG/100ML
INJECTION, SOLUTION INTRAVENOUS CONTINUOUS
Status: DISCONTINUED | OUTPATIENT
Start: 2023-02-03 | End: 2023-02-03 | Stop reason: HOSPADM

## 2023-02-03 RX ORDER — ONDANSETRON 2 MG/ML
INJECTION INTRAMUSCULAR; INTRAVENOUS PRN
Status: DISCONTINUED | OUTPATIENT
Start: 2023-02-03 | End: 2023-02-03

## 2023-02-03 RX ORDER — INDOMETHACIN 50 MG/1
SUPPOSITORY RECTAL PRN
Status: DISCONTINUED | OUTPATIENT
Start: 2023-02-03 | End: 2023-02-03 | Stop reason: HOSPADM

## 2023-02-03 RX ORDER — LIDOCAINE HYDROCHLORIDE 20 MG/ML
INJECTION, SOLUTION INFILTRATION; PERINEURAL PRN
Status: DISCONTINUED | OUTPATIENT
Start: 2023-02-03 | End: 2023-02-03

## 2023-02-03 RX ORDER — LIDOCAINE 40 MG/G
CREAM TOPICAL
Status: DISCONTINUED | OUTPATIENT
Start: 2023-02-03 | End: 2023-02-03 | Stop reason: HOSPADM

## 2023-02-03 RX ORDER — OXYCODONE HYDROCHLORIDE 5 MG/1
10 TABLET ORAL EVERY 4 HOURS PRN
Status: DISCONTINUED | OUTPATIENT
Start: 2023-02-03 | End: 2023-02-03 | Stop reason: HOSPADM

## 2023-02-03 RX ORDER — FENTANYL CITRATE 0.05 MG/ML
50 INJECTION, SOLUTION INTRAMUSCULAR; INTRAVENOUS EVERY 5 MIN PRN
Status: DISCONTINUED | OUTPATIENT
Start: 2023-02-03 | End: 2023-02-03 | Stop reason: HOSPADM

## 2023-02-03 RX ORDER — OXYCODONE HYDROCHLORIDE 5 MG/1
5 TABLET ORAL EVERY 4 HOURS PRN
Status: DISCONTINUED | OUTPATIENT
Start: 2023-02-03 | End: 2023-02-03 | Stop reason: HOSPADM

## 2023-02-03 RX ORDER — FENTANYL CITRATE 0.05 MG/ML
25 INJECTION, SOLUTION INTRAMUSCULAR; INTRAVENOUS EVERY 5 MIN PRN
Status: DISCONTINUED | OUTPATIENT
Start: 2023-02-03 | End: 2023-02-03 | Stop reason: HOSPADM

## 2023-02-03 RX ORDER — HYDROMORPHONE HCL IN WATER/PF 6 MG/30 ML
0.4 PATIENT CONTROLLED ANALGESIA SYRINGE INTRAVENOUS EVERY 5 MIN PRN
Status: DISCONTINUED | OUTPATIENT
Start: 2023-02-03 | End: 2023-02-03 | Stop reason: HOSPADM

## 2023-02-03 RX ORDER — FENTANYL CITRATE 50 UG/ML
INJECTION, SOLUTION INTRAMUSCULAR; INTRAVENOUS PRN
Status: DISCONTINUED | OUTPATIENT
Start: 2023-02-03 | End: 2023-02-03

## 2023-02-03 RX ORDER — ONDANSETRON 4 MG/1
4 TABLET, ORALLY DISINTEGRATING ORAL EVERY 30 MIN PRN
Status: DISCONTINUED | OUTPATIENT
Start: 2023-02-03 | End: 2023-02-03 | Stop reason: HOSPADM

## 2023-02-03 RX ORDER — DEXAMETHASONE SODIUM PHOSPHATE 4 MG/ML
INJECTION, SOLUTION INTRA-ARTICULAR; INTRALESIONAL; INTRAMUSCULAR; INTRAVENOUS; SOFT TISSUE PRN
Status: DISCONTINUED | OUTPATIENT
Start: 2023-02-03 | End: 2023-02-03

## 2023-02-03 RX ORDER — HYDROMORPHONE HCL IN WATER/PF 6 MG/30 ML
0.2 PATIENT CONTROLLED ANALGESIA SYRINGE INTRAVENOUS EVERY 5 MIN PRN
Status: DISCONTINUED | OUTPATIENT
Start: 2023-02-03 | End: 2023-02-03 | Stop reason: HOSPADM

## 2023-02-03 RX ORDER — ONDANSETRON 2 MG/ML
4 INJECTION INTRAMUSCULAR; INTRAVENOUS EVERY 30 MIN PRN
Status: DISCONTINUED | OUTPATIENT
Start: 2023-02-03 | End: 2023-02-03 | Stop reason: HOSPADM

## 2023-02-03 RX ADMIN — SODIUM CHLORIDE: 9 INJECTION, SOLUTION INTRAVENOUS at 16:36

## 2023-02-03 RX ADMIN — PIPERACILLIN AND TAZOBACTAM 3.38 G: 3; .375 INJECTION, POWDER, FOR SOLUTION INTRAVENOUS at 06:16

## 2023-02-03 RX ADMIN — PROCHLORPERAZINE EDISYLATE 5 MG: 5 INJECTION INTRAMUSCULAR; INTRAVENOUS at 13:30

## 2023-02-03 RX ADMIN — SUCCINYLCHOLINE CHLORIDE 100 MG: 20 INJECTION, SOLUTION INTRAMUSCULAR; INTRAVENOUS; PARENTERAL at 12:27

## 2023-02-03 RX ADMIN — FENTANYL CITRATE 50 MCG: 50 INJECTION, SOLUTION INTRAMUSCULAR; INTRAVENOUS at 12:27

## 2023-02-03 RX ADMIN — ONDANSETRON 4 MG: 2 INJECTION INTRAMUSCULAR; INTRAVENOUS at 12:39

## 2023-02-03 RX ADMIN — SODIUM CHLORIDE: 9 INJECTION, SOLUTION INTRAVENOUS at 05:06

## 2023-02-03 RX ADMIN — PIPERACILLIN AND TAZOBACTAM 3.38 G: 3; .375 INJECTION, POWDER, FOR SOLUTION INTRAVENOUS at 18:48

## 2023-02-03 RX ADMIN — OXYCODONE HYDROCHLORIDE 2.5 MG: 5 TABLET ORAL at 10:32

## 2023-02-03 RX ADMIN — ONDANSETRON 4 MG: 2 INJECTION INTRAMUSCULAR; INTRAVENOUS at 08:26

## 2023-02-03 RX ADMIN — DEXAMETHASONE SODIUM PHOSPHATE 4 MG: 4 INJECTION, SOLUTION INTRA-ARTICULAR; INTRALESIONAL; INTRAMUSCULAR; INTRAVENOUS; SOFT TISSUE at 12:39

## 2023-02-03 RX ADMIN — TAMSULOSIN HYDROCHLORIDE 0.4 MG: 0.4 CAPSULE ORAL at 07:38

## 2023-02-03 RX ADMIN — LIDOCAINE HYDROCHLORIDE 50 MG: 20 INJECTION, SOLUTION INFILTRATION; PERINEURAL at 12:27

## 2023-02-03 RX ADMIN — PIPERACILLIN AND TAZOBACTAM 3.38 G: 3; .375 INJECTION, POWDER, FOR SOLUTION INTRAVENOUS at 12:31

## 2023-02-03 RX ADMIN — PROPOFOL 200 MG: 10 INJECTION, EMULSION INTRAVENOUS at 12:27

## 2023-02-03 RX ADMIN — HYDROMORPHONE HYDROCHLORIDE 0.4 MG: 0.2 INJECTION, SOLUTION INTRAMUSCULAR; INTRAVENOUS; SUBCUTANEOUS at 08:18

## 2023-02-03 RX ADMIN — PIPERACILLIN AND TAZOBACTAM 3.38 G: 3; .375 INJECTION, POWDER, FOR SOLUTION INTRAVENOUS at 01:12

## 2023-02-03 RX ADMIN — PHENYLEPHRINE HYDROCHLORIDE 50 MCG: 10 INJECTION INTRAVENOUS at 12:45

## 2023-02-03 RX ADMIN — SODIUM CHLORIDE, POTASSIUM CHLORIDE, SODIUM LACTATE AND CALCIUM CHLORIDE: 600; 310; 30; 20 INJECTION, SOLUTION INTRAVENOUS at 11:54

## 2023-02-03 RX ADMIN — FENTANYL CITRATE 50 MCG: 50 INJECTION, SOLUTION INTRAMUSCULAR; INTRAVENOUS at 12:16

## 2023-02-03 RX ADMIN — ACETAMINOPHEN 650 MG: 325 TABLET, FILM COATED ORAL at 07:38

## 2023-02-03 ASSESSMENT — LIFESTYLE VARIABLES: TOBACCO_USE: 0

## 2023-02-03 ASSESSMENT — ACTIVITIES OF DAILY LIVING (ADL)
ADLS_ACUITY_SCORE: 35
ADLS_ACUITY_SCORE: 35
ADLS_ACUITY_SCORE: 18
ADLS_ACUITY_SCORE: 35
ADLS_ACUITY_SCORE: 18
ADLS_ACUITY_SCORE: 35
ADLS_ACUITY_SCORE: 18
ADLS_ACUITY_SCORE: 35
ADLS_ACUITY_SCORE: 18
ADLS_ACUITY_SCORE: 35

## 2023-02-03 ASSESSMENT — ENCOUNTER SYMPTOMS: SEIZURES: 0

## 2023-02-03 NOTE — PLAN OF CARE
"Goal Outcome Evaluation:    Orientation/Cognitive: A&Ox4  Observation Goals (Met/ Not Met): Partially met  Mobility Level/Assist Equipment: IND  Fall Risk (Y/N):N  Behavior Concerns: NA  Pain Management: 4/10 abd pain PRN tylenol given w/ effect  Tele/VS/O2: BP slightly elevated  ABNL Lab/BG: ALT\"278 AST:200 Bili T:4.1 bili direct:3.32  Diet: NPO  Bowel/Bladder:Continent   Skin Concerns:4 Lap sites w/ dried drainage unchanged  Drains/Devices:PIV: infusing NS@100ml/hr  Tests/Procedures for next shift:ERCP  Anticipated DC date & active delays: 2/3?  Patient Stated Goal for Today: To go home    "

## 2023-02-03 NOTE — PROGRESS NOTES
MD Notification    Notified Person: MD    Notified Person Name: Courtney    Notification Date/Time: 2/3/23 at 4:20 pm     Notification Interaction: Vocera page    Purpose of Notification: Patient does not have a diet order, orders for clears and advance as tolerates? Thank you.    Orders Received: Orders for clears and advance as tolerated received.     Comments:

## 2023-02-03 NOTE — PLAN OF CARE
Orientation/Cognitive: A/O  Observation Goals (Met/ Not Met): IP  Mobility Level/Assist Equipment: ind  Fall Risk (Y/N): no  Behavior Concerns: green  Pain Management: tylx1, dil x1, oxy x1; pain in abd  Tele/VS/O2: VSS RA x HTN  ABNL Lab/BG: ALT 24, , bili direct 2.4, bili total 3.2  Diet: clears  Bowel/Bladder: continent, retention  Skin Concerns: 4 lap sites, dried drainage  Drains/Devices: PIV  mL/hr  Tests/Procedures for next shift: none scheduled  Anticipated DC date & active delays: 2/4-5  Patient Stated Goal for Today: urinate, pain management

## 2023-02-03 NOTE — PROGRESS NOTES
"""Call if vision decreases or RD warning signs increases/RD warnings given. No signs of retinal tear. Retinal detachment precautions discussed.  """ Surgery Progress Note  2/3/2023     Subjective: Continues to feel as if he is struggling to start a void but does feel like he in completely emptying his bladder. Has been ambulating independently. Tolerating PO intake, NPO for procedure this morning. Anxious to go home.    Objective:  Temp:  [98.5  F (36.9  C)-99.1  F (37.3  C)] 98.6  F (37  C)  Pulse:  [62-80] 66  Resp:  [14-17] 15  BP: (136-146)/(75-84) 139/79  SpO2:  [97 %-99 %] 97 %  I/O last 3 completed shifts:  In: -   Out: 2000 [Urine:2000]    Gen: Awake, alert, NAD   Resp: Equal chest rise bilaterally, breathing comfortably on room air  Abd: Soft, non distended, appropriate incisional tenderness  Ext: WWP  Incision: C/d/i w/o erythema/drainage, covered with steri strips    BMP  Recent Labs   Lab 02/02/23 0657 02/01/23  0538 01/31/23 2217    136 141   POTASSIUM 3.8 4.2 3.4   CHLORIDE 103 100 98   ERIK 8.9 9.1 9.8   CO2 23 26 29   BUN 5.6* 11.2 12.2   CR 1.06 1.02 1.00   GLC 93 105* 128*     CBC  Recent Labs   Lab 02/01/23  0538 01/31/23 2217   WBC 6.7 10.9   RBC 5.14 5.53   HGB 13.9 15.2   HCT 42.7 45.0   MCV 83 81   MCH 27.0 27.5   MCHC 32.6 33.8   RDW 12.0 12.0    248     AST/ALT & Alk Phos  Recent Labs   Lab 02/02/23 0657 02/01/23  0538 01/31/23 2217   *  197* 290* 42   *  278* 203* 39   ALKPHOS 88  89 88 91     Bili  Recent Labs   Lab Test 02/02/23 0657 02/01/23  0538 01/31/23 2217 01/13/23  1151   BILITOTAL 4.1*  4.1*  4.1* 1.2 0.4 0.4   DBIL 3.32*  3.32* 0.72* <0.20  --      Lipase/Amlyase  Recent Labs   Lab 01/31/23  2217   LIPASE 40       A/P: Eddie LUND Cherry is a 22 year old male with no significant past medical history now s/p uncomplicated laparoscopic cholecystectomy 02/01/2023. Patient with new direct hyperbilirubinemia today.    - ERCP this morning with GI  - NPO for procedure, diet advancement per GI after ERCP  - Straight cath and bladder scan prn for urinary retention  - Hopefully home in 1-2  days    Anat Silva MD, MPH 02/03/23 07:55 AM   General Surgery Resident - PGY4

## 2023-02-03 NOTE — ANESTHESIA PREPROCEDURE EVALUATION
Anesthesia Pre-Procedure Evaluation    Patient: Eddie Carey   MRN: [id number] : 2000        Procedure : Procedure(s):  ENDOSCOPIC ULTRASOUND, ESOPHAGOSCOPY / UPPER GASTROINTESTINAL TRACT (GI)  POSSIBLE ENDOSCOPIC RETROGRADE CHOLANGIOPANCREATOGRAPHY, WITH SPHINCTEROTOMY          No past medical history on file.   Past Surgical History:   Procedure Laterality Date     LAPAROSCOPIC CHOLECYSTECTOMY N/A 2023    Procedure: laparoscopic cholecystectomy;  Surgeon: Feliz Gar MD;  Location: [facility] OR      No Known Allergies   Social History     Tobacco Use     Smoking status: Never     Smokeless tobacco: Never   Substance Use Topics     Alcohol use: Not on file      Wt Readings from Last 1 Encounters:   23 81.6 kg (180 lb)        Anesthesia Evaluation   Pt has had prior anesthetic.     No history of anesthetic complications       ROS/MED HX  ENT/Pulmonary:    (-) tobacco use and sleep apnea   Neurologic:    (-) no seizures and no CVA   Cardiovascular:    (-) hypertension   METS/Exercise Tolerance:     Hematologic:  - neg hematologic  ROS     Musculoskeletal:       GI/Hepatic:     (+) cholecystitis/cholelithiasis,  (-) GERD and liver disease   Renal/Genitourinary:    (-) renal disease   Endo:    (-) Type II DM and thyroid disease   Psychiatric/Substance Use:       Infectious Disease:       Malignancy:       Other:            Physical Exam    Airway        Mallampati: II   TM distance: > 3 FB   Neck ROM: full   Mouth opening: > 3 cm    Respiratory Devices and Support         Dental       (+) Minor Abnormalities - some fillings, tiny chips      Cardiovascular   cardiovascular exam normal          Pulmonary   pulmonary exam normal                OUTSIDE LABS:  CBC:   Lab Results   Component Value Date    WBC 6.7 2023    WBC 10.9 2023    HGB 13.9 2023    HGB 15.2 2023    HCT 42.7 2023    HCT 45.0 2023     2023     2023     BMP:   Lab Results    Component Value Date     02/02/2023     02/01/2023    POTASSIUM 3.8 02/02/2023    POTASSIUM 4.2 02/01/2023    CHLORIDE 103 02/02/2023    CHLORIDE 100 02/01/2023    CO2 23 02/02/2023    CO2 26 02/01/2023    BUN 5.6 (L) 02/02/2023    BUN 11.2 02/01/2023    CR 1.06 02/02/2023    CR 1.02 02/01/2023    GLC 93 02/02/2023     (H) 02/01/2023     COAGS: No results found for: PTT, INR, FIBR  POC: No results found for: BGM, HCG, HCGS  HEPATIC:   Lab Results   Component Value Date    ALBUMIN 3.9 02/03/2023    PROTTOTAL 6.7 02/03/2023     (H) 02/03/2023     (H) 02/03/2023    ALKPHOS 92 02/03/2023    BILITOTAL 3.2 (H) 02/03/2023     OTHER:   Lab Results   Component Value Date    ERIK 8.9 02/02/2023    LIPASE 40 01/31/2023       Anesthesia Plan    ASA Status:  2      Anesthesia Type: General.     - Airway: ETT   Induction: Intravenous.   Maintenance: Balanced.        Consents    Anesthesia Plan(s) and associated risks, benefits, and realistic alternatives discussed. Questions answered and patient/representative(s) expressed understanding.    - Discussed:     - Discussed with:  Patient      - Extended Intubation/Ventilatory Support Discussed: No.      - Patient is DNR/DNI Status: No    Use of blood products discussed: No .     Postoperative Care       PONV prophylaxis: Ondansetron (or other 5HT-3), Dexamethasone or Solumedrol     Comments:                Wang Beavers MD

## 2023-02-03 NOTE — OR NURSING
Patient completed fluid bolus. VSS.  Patient feeling more alert now.  Dr. Maddox updated.  Patient to return to floor.  
Patient remains quite sleepy in the PACU.  VSS.  Dr. Maddox notified and is at bedside to assess patient.  Dr. Maddox ordered that patient receive the remainder of his bag of LR.  IV fluid bolus initiated.  Will continue to monitor.  
Report given to Nel KELLOGG. Vitals stable. Voided 625 ml.  Nausea better after compazine.    
Report given to obs unit RN.  Patient returned to his room.  
No

## 2023-02-03 NOTE — PROGRESS NOTES
[facility]    Medicine Progress Note - Hospitalist Service    Date of Admission:  1/31/2023    Assessment & Plan   Patient is a 22 year old male with acute cholecystitis and possible choledocholithiasis given transaminitis, elevated tBili, and extrahepatic biliary dilatation.     Symptomatic cholelithiasis  S/p uncomplicated laparoscopic cholecystectomy 02/01/2023.  POD #2  -consult general surgery   -NPO  -PPx Zosyn  -IVF  -Antiemetics and analgesia prn.    Elevated bilirubin (trending down)  Transaminitis (trending down)  -consulted GI   -ERCP today     Urinary retention   -Straight cath and bladder scan prn   -Flomax daily  -encourage ambulation        Diet: Diet    DVT Prophylaxis: Ambulate every shift  Aleman Catheter: Not present  Lines: None     Cardiac Monitoring: ACTIVE order. Indication: Procedural area  Code Status: Full Code      Clinically Significant Risk Factors Present on Admission                               Disposition Plan      Expected Discharge Date: 02/04/2023      Destination: home with family  Discharge Comments: Pain control.  ERCP tomorrow.  GI following.        The patient's care was discussed with the Attending Physician, Dr. Valdez.    Conor Temple, CNP  Hospitalist Service  [facility]  Securely message with [employer] (more info)  Text page via [employer] Paging/Directory   ______________________________________________________________________    Interval History   -ERCP today  -patient still having a lot of pain at incision sites     Physical Exam   Vital Signs: Temp: 98.9  F (37.2  C) Temp src: Temporal BP: (!) 150/89 Pulse: 73   Resp: 14 SpO2: 98 % O2 Device: None (Room air) Oxygen Delivery: 2 LPM  Weight: 180 lbs 0 oz    Gen: Awake, alert, NAD   Resp: Equal chest rise bilaterally, breathing comfortably on room air  Abd: Soft, non distended, appropriate incisional tenderness  Ext: WWP  Dressing/Incision: C/d/i w/o  erythema/drainage  Medical Decision Making       35 MINUTES SPENT BY ME on the date of service doing chart review, history, exam, documentation & further activities per the note.  MANAGEMENT DISCUSSED with the following over the past 24 hours: patient and Dr. Valdez       Data     I have personally reviewed the following data over the past 24 hrs:    ALT: 214 (H) AST: 114 (H) AP: 92 TBILI: 3.2 (H)   ALB: 3.9 TOT PROTEIN: 6.7 LIPASE: N/A       Imaging results reviewed over the past 24 hrs:   No results found for this or any previous visit (from the past 24 hour(s)).

## 2023-02-03 NOTE — ANESTHESIA CARE TRANSFER NOTE
Patient: Eddie Carey    Procedure: Procedure(s):  ENDOSCOPIC ULTRASOUND, ESOPHAGOSCOPY / UPPER GASTROINTESTINAL TRACT (GI)  ENDOSCOPIC RETROGRADE CHOLANGIOPANCREATOGRAPHY, WITH SPHINCTEROTOMY       Diagnosis: Choledocholithiasis [K80.50]  Diagnosis Additional Information: No value filed.    Anesthesia Type:   General     Note:    Oropharynx: oropharynx clear of all foreign objects and spontaneously breathing  Level of Consciousness: awake  Oxygen Supplementation: face mask  Level of Supplemental Oxygen (L/min / FiO2): 6  Independent Airway: airway patency satisfactory and stable  Dentition: dentition unchanged  Vital Signs Stable: post-procedure vital signs reviewed and stable  Report to RN Given: handoff report given  Patient transferred to: PACU  Comments: Pt to PACU with O2 via mask, airway patent, VSS. Report to RN.  Handoff Report: Identifed the Patient, Identified the Reponsible Provider, Reviewed the pertinent medical history, Discussed the surgical course, Reviewed Intra-OP anesthesia mangement and issues during anesthesia, Set expectations for post-procedure period and Allowed opportunity for questions and acknowledgement of understanding      Vitals:  Vitals Value Taken Time   BP     Temp     Pulse 72 02/03/23 1317   Resp 17 02/03/23 1317   SpO2 100 % 02/03/23 1317   Vitals shown include unvalidated device data.    Electronically Signed By: MELVIN James CRNA  February 3, 2023  1:18 PM

## 2023-02-03 NOTE — ANESTHESIA PROCEDURE NOTES
Airway       Patient location during procedure: OR       Procedure Start/Stop Times: 2/3/2023 12:28 PM  Staff -        Performed By: anesthesiologist and CRNA  Consent for Airway        Urgency: elective  Indications and Patient Condition       Indications for airway management: wendy-procedural       Induction type:intravenous       Mask difficulty assessment: 2 - vent by mask + OA or adjuvant +/- NMBA    Final Airway Details       Final airway type: endotracheal airway       Successful airway: ETT - single  Endotracheal Airway Details        ETT size (mm): 8.0       Cuffed: yes       Successful intubation technique: direct laryngoscopy       DL Blade Type: Huynh 2       Grade View of Cords: 1       Adjucts: stylet       Position: Right       Measured from: gums/teeth       Secured at (cm): 22       Bite block used: None    Post intubation assessment        Placement verified by: capnometry, equal breath sounds and chest rise        Number of attempts at approach: 1       Number of other approaches attempted: 0       Secured with: silk tape       Ease of procedure: easy       Dentition: Intact and Unchanged    Medication(s) Administered   Medication Administration Time: 2/3/2023 12:28 PM

## 2023-02-03 NOTE — PLAN OF CARE
Goal Outcome Evaluation:        Orientation/Cognitive: A/O  Observation Goals (Met/ Not Met): IP  Mobility Level/Assist Equipment: IND  Fall Risk (Y/N): no  Behavior Concerns: green  Pain Management: tylx2, oxyx2  Tele/VS/O2: VSS RA x HTN at times  ABNL Lab/BG: BUN 5.6, , , bili direct 3.32, bili total 4.1  Diet: clears, NPO at 0000  Bowel/Bladder: continent, urinary retention, started daily flomax  Skin Concerns: 4 abd incisions, dried drainage  Drains/Devices: PIV  mL/hr  Tests/Procedures for next shift: ERCP 2/3  Anticipated DC date & active delays: 2/3-4 pending ERCP  Patient Stated Goal for Today: urinate

## 2023-02-04 VITALS
SYSTOLIC BLOOD PRESSURE: 132 MMHG | DIASTOLIC BLOOD PRESSURE: 87 MMHG | TEMPERATURE: 98 F | HEIGHT: 72 IN | OXYGEN SATURATION: 98 % | HEART RATE: 65 BPM | WEIGHT: 180 LBS | RESPIRATION RATE: 20 BRPM | BODY MASS INDEX: 24.38 KG/M2

## 2023-02-04 LAB
ALBUMIN SERPL BCG-MCNC: 3.9 G/DL (ref 3.5–5.2)
ALP SERPL-CCNC: 96 U/L (ref 40–129)
ALT SERPL W P-5'-P-CCNC: 184 U/L (ref 10–50)
ANION GAP SERPL CALCULATED.3IONS-SCNC: 10 MMOL/L (ref 7–15)
AST SERPL W P-5'-P-CCNC: 88 U/L (ref 10–50)
BASOPHILS # BLD AUTO: 0 10E3/UL (ref 0–0.2)
BASOPHILS NFR BLD AUTO: 1 %
BILIRUB SERPL-MCNC: 1.5 MG/DL
BUN SERPL-MCNC: 5.5 MG/DL (ref 6–20)
CALCIUM SERPL-MCNC: 9.2 MG/DL (ref 8.6–10)
CHLORIDE SERPL-SCNC: 101 MMOL/L (ref 98–107)
CREAT SERPL-MCNC: 1.01 MG/DL (ref 0.67–1.17)
DEPRECATED HCO3 PLAS-SCNC: 27 MMOL/L (ref 22–29)
EOSINOPHIL # BLD AUTO: 0.1 10E3/UL (ref 0–0.7)
EOSINOPHIL NFR BLD AUTO: 1 %
ERYTHROCYTE [DISTWIDTH] IN BLOOD BY AUTOMATED COUNT: 12.2 % (ref 10–15)
GFR SERPL CREATININE-BSD FRML MDRD: >90 ML/MIN/1.73M2
GLUCOSE SERPL-MCNC: 130 MG/DL (ref 70–99)
HCT VFR BLD AUTO: 37.7 % (ref 40–53)
HGB BLD-MCNC: 12.9 G/DL (ref 13.3–17.7)
IMM GRANULOCYTES # BLD: 0 10E3/UL
IMM GRANULOCYTES NFR BLD: 1 %
LYMPHOCYTES # BLD AUTO: 1.2 10E3/UL (ref 0.8–5.3)
LYMPHOCYTES NFR BLD AUTO: 25 %
MCH RBC QN AUTO: 27.5 PG (ref 26.5–33)
MCHC RBC AUTO-ENTMCNC: 34.2 G/DL (ref 31.5–36.5)
MCV RBC AUTO: 80 FL (ref 78–100)
MONOCYTES # BLD AUTO: 0.4 10E3/UL (ref 0–1.3)
MONOCYTES NFR BLD AUTO: 9 %
NEUTROPHILS # BLD AUTO: 3.1 10E3/UL (ref 1.6–8.3)
NEUTROPHILS NFR BLD AUTO: 63 %
NRBC # BLD AUTO: 0 10E3/UL
NRBC BLD AUTO-RTO: 0 /100
PLATELET # BLD AUTO: 141 10E3/UL (ref 150–450)
POTASSIUM SERPL-SCNC: 3.2 MMOL/L (ref 3.4–5.3)
PROT SERPL-MCNC: 6.8 G/DL (ref 6.4–8.3)
RBC # BLD AUTO: 4.69 10E6/UL (ref 4.4–5.9)
SODIUM SERPL-SCNC: 138 MMOL/L (ref 136–145)
WBC # BLD AUTO: 4.8 10E3/UL (ref 4–11)

## 2023-02-04 PROCEDURE — 82310 ASSAY OF CALCIUM: CPT | Performed by: HOSPITALIST

## 2023-02-04 PROCEDURE — 99231 SBSQ HOSP IP/OBS SF/LOW 25: CPT | Performed by: HOSPITALIST

## 2023-02-04 PROCEDURE — 85025 COMPLETE CBC W/AUTO DIFF WBC: CPT | Performed by: HOSPITALIST

## 2023-02-04 PROCEDURE — 250N000011 HC RX IP 250 OP 636: Performed by: INTERNAL MEDICINE

## 2023-02-04 PROCEDURE — 250N000013 HC RX MED GY IP 250 OP 250 PS 637: Performed by: NURSE PRACTITIONER

## 2023-02-04 PROCEDURE — 258N000003 HC RX IP 258 OP 636: Performed by: INTERNAL MEDICINE

## 2023-02-04 PROCEDURE — 36415 COLL VENOUS BLD VENIPUNCTURE: CPT | Performed by: HOSPITALIST

## 2023-02-04 PROCEDURE — 250N000011 HC RX IP 250 OP 636: Performed by: SURGERY

## 2023-02-04 RX ORDER — TAMSULOSIN HYDROCHLORIDE 0.4 MG/1
0.4 CAPSULE ORAL DAILY
Qty: 7 CAPSULE | Refills: 0 | Status: SHIPPED | OUTPATIENT
Start: 2023-02-05 | End: 2024-07-31

## 2023-02-04 RX ADMIN — SODIUM CHLORIDE: 9 INJECTION, SOLUTION INTRAVENOUS at 04:34

## 2023-02-04 RX ADMIN — TAMSULOSIN HYDROCHLORIDE 0.4 MG: 0.4 CAPSULE ORAL at 09:00

## 2023-02-04 RX ADMIN — PIPERACILLIN AND TAZOBACTAM 3.38 G: 3; .375 INJECTION, POWDER, FOR SOLUTION INTRAVENOUS at 06:08

## 2023-02-04 RX ADMIN — PIPERACILLIN AND TAZOBACTAM 3.38 G: 3; .375 INJECTION, POWDER, FOR SOLUTION INTRAVENOUS at 00:37

## 2023-02-04 RX ADMIN — ONDANSETRON 4 MG: 4 TABLET, ORALLY DISINTEGRATING ORAL at 09:02

## 2023-02-04 RX ADMIN — PIPERACILLIN AND TAZOBACTAM 3.38 G: 3; .375 INJECTION, POWDER, FOR SOLUTION INTRAVENOUS at 13:00

## 2023-02-04 ASSESSMENT — ACTIVITIES OF DAILY LIVING (ADL)
ADLS_ACUITY_SCORE: 18

## 2023-02-04 NOTE — PROGRESS NOTES
Per Dr. Nathan's (GI) ERCP procedure note, pt should remain on clear liquid diet for 1 day. Thus far pt tolerating clear liquids w/o nausea. Will continue to monitor.

## 2023-02-04 NOTE — PROGRESS NOTES
2301-0108:    POD 3 lap appy. ERCP yesterday. Lap sites CDI ex small amount dried drainage and bruising at umbilical site. Abdomen soft, appropriately tender. Melanie clears, denies nausea. Up ind, encouraging frequent ambulation when awake. IVF infusing via PIV. Zosyn q 6 hrs. Bowel sounds+ x4, passing gas. Small, soft BM this shift.     Pt voiding adequate amounts, but still retaining to a certain degree. Did NOT require straight cath. PVR max was 395mL -- encouraged pt to ambulate around unit and try to void again, pt was able to void almost 200 more and re-bladder scanned for 214mL. Subsequent PVR later overnight was also 216mL. Pt does not feel uncomfortable after voiding, he feels like he is emptying. Will continue to encourage frequent ambulation.

## 2023-02-04 NOTE — DISCHARGE SUMMARY
"[facility]  Hospitalist Discharge Summary      Date of Admission:  1/31/2023  Date of Discharge:  2/4/2023  Discharging Provider: Phil Valdez MD  Discharge Service: Hospitalist Service    Discharge Diagnoses   1. Acute cholecystitis status post laparoscopic cholecystectomy    2. Choledocholithiasis status post ecndoscopic retrograde cholangiopancreatography   3. Acute urinary retention     Follow-ups Needed After Discharge   Follow-up Appointments     Follow-up and recommended labs and tests      Dr. Gar's surgical office will contact you in approximately 2 weeks to   check on your progress and answer any questions you may have. If you are   doing well, you will not need to return for a follow up appointment. If   any concerns are identified over the phone, we will help you make an   appointment to see a provider.  If you have not received a phone call, have any questions or concerns, or   would like to be seen, please call us at [phone] and ask to speak   with our nurse. We are located at [address] [place] [address].         Follow-up and recommended labs and tests       Follow up with surgeon as scheduled.  Follow up with primary care   provider in a week with CBC and CMP.           Unresulted Labs Ordered in the Past 30 Days of this Admission     Date and Time Order Name Status Description    2/1/2023  4:26 PM Surgical Pathology Exam In process       These results will be followed up by surgeon     Discharge Disposition   Discharged to home  Condition at discharge: Stable    Hospital Course   HPI:  \"Has been obtained from the patient, who is a good historian.  I did discuss with ER attending, Dr. Justice and I reviewed recent outpatient surgery notes.  Mr. Eddie Carey and is a very pleasant 22-year-old male with a past medical history of gallstones, who presented for evaluation of abdominal pain.  It seems that the patient had episodes of " "intermittent abdominal pain in the last few months.  He was diagnosed with cholelithiasis.  He did see Dr. Morelos recently on 01/24/2023 and an outpatient laparoscopic cholecystectomy was scheduled for 03/03/2023.  He states that yesterday he ate a snack and after that he started having severe abdominal pain located in the mid abdomen, described as a sharp pain, 10/10 intensity, nonradiating.  He states that this pain was more severe than the pain that he experienced in the past.  He reported some associated nausea and one episode of emesis after he arrived in ER.  He denies any recent fevers.  He denies chest pain, no shortness of breath.  He denies headache.  No dizziness.  No diarrhea, no constipation, no leg swellings.  In ER, he was seen by Dr. Justice his vitals showed a blood pressure of 166/95 later on improved to 133/85, heart rate was 64, respiratory rate 22, oxygen saturation 98% on room air and temperature 99.7.    He did have basic blood work, which showed unremarkable BMP with a sodium of 141, potassium 3.4, chloride 98, bicarbonate 29, BUN 12.2, creatinine 1, Calcium 9.8, anion gap of 14, albumin 5, total protein 8, alkaline phosphatase 91, ALT 39, AST 42, total bilirubin was 0.4, glucose 128, lipase 40.  CBC with white blood cells of 10.9, hemoglobin 15.2, hematocrit 45 and platelet count 248.  His UA was negative for nitrites, negative for leukocyte esterase.    He had another ultrasound of his abdomen, which showed tone and sludge present with modest gallbladder wall thickening measuring 5-6 mm.  There is a mild dilation of the extrahepatic bile duct.  In ER, he received a bolus of normal saline, 1 dose of IV Dilaudid 0.5 mg and IV Toradol and 1 dose of IV Zofran.  ER attending discussed with surgery on call, who recommended admission by Hospitalist Service.\"    He is postoperative day # 3 laparoscopic cholecystectomy and postoperative day # 1 ecndoscopic retrograde cholangiopancreatography.  He is " doing well and tolerating a diet.    He has been having some urinary retention and was started on tamsulosin.  This has improved but will continue tamsulosin for 1 more week.    Discharge home and follow up with primary care provider and general surgery     Consultations This Hospital Stay   SURGERY GENERAL IP CONSULT  GASTROENTEROLOGY IP CONSULT    Code Status   Full Code    Time Spent on this Encounter   I, Phil Valdez MD, personally saw the patient today and spent greater than 30 minutes discharging this patient.       Phil Valdez MD  [facility]  [address] [facility] [address]  Phone: [phone]  ______________________________________________________________________    Physical Exam   Vital Signs: Temp: 98  F (36.7  C) Temp src: Oral BP: 132/87 Pulse: 65   Resp: 20 SpO2: 98 % O2 Device: None (Room air) Oxygen Delivery: 2 LPM  Weight: 180 lbs 0 oz  Constitutional: awake, alert, cooperative, no apparent distress  GI: normal bowel sounds, soft, non-distended, non-tender  Neuropsychiatric: General: normal, calm and normal eye contact    Primary Care Physician   Physician No Ref-Primary    Discharge Orders      Reason for your hospital stay    Acute cholecystitis     Follow-up and recommended labs and tests    Dr. Gar's surgical office will contact you in approximately 2 weeks to check on your progress and answer any questions you may have. If you are doing well, you will not need to return for a follow up appointment. If any concerns are identified over the phone, we will help you make an appointment to see a provider.  If you have not received a phone call, have any questions or concerns, or would like to be seen, please call us at [phone] and ask to speak with our nurse. We are located at [address] [place] [address], [place] [address].     Activity    Please see attached discharge instructions.     Reason for your  hospital stay    Gallbladder surgery     Follow-up and recommended labs and tests     Follow up with surgeon as scheduled.  Follow up with primary care provider in a week with CBC and CMP.     Diet    Please see attached discharge instructions.       Significant Results and Procedures   Most Recent 3 CBC's:Recent Labs   Lab Test 02/04/23  1008 02/01/23  0538 01/31/23  2217   WBC 4.8 6.7 10.9   HGB 12.9* 13.9 15.2   MCV 80 83 81   * 170 248     Most Recent 3 BMP's:Recent Labs   Lab Test 02/04/23  1008 02/02/23  0657 02/01/23  0538    138 136   POTASSIUM 3.2* 3.8 4.2   CHLORIDE 101 103 100   CO2 27 23 26   BUN 5.5* 5.6* 11.2   CR 1.01 1.06 1.02   ANIONGAP 10 12 10   ERIK 9.2 8.9 9.1   * 93 105*     Most Recent 2 LFT's:Recent Labs   Lab Test 02/04/23  1008 02/03/23  0905   AST 88* 114*   * 214*   ALKPHOS 96 92   BILITOTAL 1.5* 3.2*   ,   Results for orders placed or performed during the hospital encounter of 01/31/23   US Abdomen Limited (RUQ)    Narrative    EXAM: US ABDOMEN LIMITED  LOCATION: [facility]  DATE/TIME: 1/31/2023 11:42 PM    INDICATION: RUQ pain  COMPARISON: 1/13/2023  TECHNIQUE: Limited abdominal ultrasound.    FINDINGS:    GALLBLADDER: Stones and sludge present with modest gallbladder wall thickening measuring 5-6 mm. Tenderness now present over the gallbladder during exam, positive sonographic Lion's sign.    BILE DUCTS: Mild extrahepatic bile duct dilatation The common duct measures 7 mm.    LIVER: Normal parenchyma with smooth contour. No focal mass.    RIGHT KIDNEY: No hydronephrosis.    PANCREAS: The visualized portions are normal.    No ascites.      Impression    IMPRESSION:  1.  Cholelithiasis and biliary sludge. Mild gallbladder wall thickening appears slightly more pronounced and there is now a positive sonographic Lion's sign with tenderness noted over the gallbladder.  2.  Mild dilatation of extrahepatic bile duct now present.              Discharge Medications   Current Discharge Medication List      START taking these medications    Details   HYDROcodone-acetaminophen (NORCO) 5-325 MG tablet Take 1 tablet by mouth every 4 hours as needed for severe pain (7-10)  Qty: 10 tablet, Refills: 0    Associated Diagnoses: Postoperative pain      ondansetron (ZOFRAN ODT) 4 MG ODT tab Take 1 tablet (4 mg) by mouth every 6 hours as needed for nausea  Qty: 10 tablet, Refills: 0    Associated Diagnoses: Postoperative pain      senna (SENOKOT) 8.6 MG tablet Take 1 tablet by mouth 2 times daily  Qty: 30 tablet, Refills: 0    Associated Diagnoses: Postoperative pain      tamsulosin (FLOMAX) 0.4 MG capsule Take 1 capsule (0.4 mg) by mouth daily  Qty: 7 capsule, Refills: 0    Associated Diagnoses: Acute urinary retention           Allergies   No Known Allergies

## 2023-02-04 NOTE — PROGRESS NOTES
GASTROENTEROLOGY PROGRESS NOTE       SUBJECTIVE:  Doing well after ERCP. Minimal belly pain, passing stool and gas. Advancing diet. LFTs improving.     OBJECTIVE:  /87 (BP Location: Left arm)   Pulse 65   Temp 98  F (36.7  C) (Oral)   Resp 20   Ht 1.829 m (6')   Wt 81.6 kg (180 lb)   SpO2 98%   BMI 24.41 kg/m    Temp (24hrs), Av.2  F (36.8  C), Min:97.8  F (36.6  C), Max:98.6  F (37  C)    No data found.    Intake/Output Summary (Last 24 hours) at 2023 1151  Last data filed at 2023 0026  Gross per 24 hour   Intake 1080 ml   Output 3075 ml   Net -1995 ml        PHYSICAL EXAM     Gastrointestinal: Active BS, soft, NT/ND, steri-strips        Recent Labs   Lab Test 23  1008 23  0538 23  2217   WBC 4.8 6.7 10.9   HGB 12.9* 13.9 15.2   MCV 80 83 81   * 170 248     Recent Labs   Lab Test 23  1008 23  0657 23  0538   POTASSIUM 3.2* 3.8 4.2   CHLORIDE 101 103 100   CO2 27 23 26   BUN 5.5* 5.6* 11.2   ANIONGAP 10 12 10     Recent Labs   Lab Test 23  1008 23  0905 23  0657 23  0538 23  2346 23  2217 23  1151   ALBUMIN 3.9 3.9 4.0  3.9   < >  --  5.0 4.4   BILITOTAL 1.5* 3.2* 4.1*  4.1*  4.1*   < >  --  0.4 0.4   * 214* 278*  278*   < >  --  39 42   AST 88* 114* 200*  197*   < >  --  42 28   PROTEIN  --   --   --   --  10*  --   --    LIPASE  --   --   --   --   --  40 172    < > = values in this interval not displayed.           Principal Problem:    Choledocholithiasis    Assessment: Doing well after ERCP. No evidence of pancreatitis. LFTs better.    Plan: Will sign off, call with questions.           Jose David Kapoor MD  [facility]  Office:  [phone]

## 2023-02-04 NOTE — ANESTHESIA POSTPROCEDURE EVALUATION
Patient: Eddie Carey    Procedure: Procedure(s):  ENDOSCOPIC ULTRASOUND, ESOPHAGOSCOPY / UPPER GASTROINTESTINAL TRACT (GI)  ENDOSCOPIC RETROGRADE CHOLANGIOPANCREATOGRAPHY, WITH SPHINCTEROTOMY       Anesthesia Type:  General    Note:  Disposition: Inpatient   Postop Pain Control: Uneventful            Sign Out: Well controlled pain   PONV: No   Neuro/Psych: Uneventful            Sign Out: Acceptable/Baseline neuro status   Airway/Respiratory: Uneventful            Sign Out: Acceptable/Baseline resp. status   CV/Hemodynamics: Uneventful            Sign Out: Acceptable CV status; No obvious hypovolemia; No obvious fluid overload   Other NRE: NONE   DID A NON-ROUTINE EVENT OCCUR? No           Last vitals:  Vitals Value Taken Time   /85 02/03/23 1432   Temp 36.6  C (97.9  F) 02/03/23 1432   Pulse 55 02/03/23 1436   Resp 13 02/03/23 1436   SpO2 96 % 02/03/23 1436   Vitals shown include unvalidated device data.    Electronically Signed By: Wang Beavers MD  February 3, 2023  6:07 PM

## 2023-02-04 NOTE — PLAN OF CARE
Goal Outcome Evaluation:      Plan of Care Reviewed With: patient    Orientation/Cognitive: AOx4, POD3 from lap appy; ERCP done yesterday  Observation Goals (Met/ Not Met): Inpatient  Mobility Level/Assist Equipment: IND  Fall Risk (Y/N): N  Behavior Concerns: Green  Pain Management: Denies pain, no pain medications given  Tele/VS/O2: VSS on RA   ABNL Lab/BG: See results  Diet: Regular, tolerating well, denies nausea  Bowel/Bladder: Continent, voiding adequately, PVR 84 ml  Skin Concerns: 4 lap sites, intact, dried drainage noted and unchanged   Drains/Devices: PIV out  Tests/Procedures for next shift: None   Anticipated DC date & active delays: Today to home  Patient Stated Goal for Today: Keep urinating     Medications and AVS reviewed with patient and wife. Script for Flomax sent with patient. All questions answered upon discharge to home.

## 2023-02-04 NOTE — PROGRESS NOTES
General Surgery Note    Stable S/P Lap Mary  POD3    -Doing better after ERCP.  Labs coincide with this.  -Ok to discharge from surgical perspective.  -Surgery will followup with phone call in ~2 weeks.  Our number will be in discharge instructions if concerns prior to this.  -Patient in agreement with discharge home today.    Feels much better.  Pain is minimal.  No nausea.  Tolerating regular food now.    /87 (BP Location: Left arm)   Pulse 65   Temp 98  F (36.7  C) (Oral)   Resp 20   Ht 1.829 m (6')   Wt 81.6 kg (180 lb)   SpO2 98%   BMI 24.41 kg/m      Intake/Output Summary (Last 24 hours) at 2/4/2023 1231  Last data filed at 2/4/2023 0026  Gross per 24 hour   Intake 1080 ml   Output 3075 ml   Net -1995 ml     Abd: soft  Inc: CDI    Labs reviewed.  LFTs improved.  WBC normal.    Dawit Seals PA-C  [phone]

## 2023-02-04 NOTE — PLAN OF CARE
Goal Outcome Evaluation:      Plan of Care Reviewed With: patient    Orientation/Cognitive: AOx4, POD2 from lap appy; ERCP done today  Observation Goals (Met/ Not Met): Inpatient  Mobility Level/Assist Equipment: IND  Fall Risk (Y/N): N  Behavior Concerns: Green  Pain Management: Denies pain  Tele/VS/O2: VSS on RA   ABNL Lab/BG: See results  Diet: Clears advance as tolerates  Bowel/Bladder: Continent, voiding adequately   Skin Concerns: 4 lap sites, intact, dried drainage noted and unchanged   Drains/Devices: PIV  mL/hr  Tests/Procedures for next shift: None   Anticipated DC date & active delays: Possibly 2/4 pending progress  Patient Stated Goal for Today: Keep urinating

## 2023-02-05 LAB
PATH REPORT.COMMENTS IMP SPEC: NORMAL
PATH REPORT.COMMENTS IMP SPEC: NORMAL
PATH REPORT.FINAL DX SPEC: NORMAL
PATH REPORT.GROSS SPEC: NORMAL
PATH REPORT.MICROSCOPIC SPEC OTHER STN: NORMAL
PATH REPORT.RELEVANT HX SPEC: NORMAL
PHOTO IMAGE: NORMAL

## 2023-02-17 ENCOUNTER — TELEPHONE (OUTPATIENT)
Dept: SURGERY | Facility: CLINIC | Age: 23
End: 2023-02-17
Payer: COMMERCIAL

## 2023-02-17 NOTE — TELEPHONE ENCOUNTER
[facility]  Post op call note - Laparoscopic Cholecystectomy    Eddie Carey was called for an update regarding his recovery.  He underwent surgery on 02/01/2023.  Today he tells me he is doing well and has no complaints or concerns about diet, bowel function or wound healing.  He has no complaints about fever/chills, n/v/d, abdominal pain, changes in urination or BM, or wound issues.     Pathology:  Final Diagnosis   Gallbladder, cholecystectomy:  -Acute on chronic cholecystitis with cholelithiasis     This was discussed with the patient.      He may remove steri strips if they are still in place and keep the wounds clean.  He may advance his activity as tolerated but avoid heavy lifting initially.  The patient states all of his questions were answered and he understands our discussion. He agrees to follow up as needed and to call our office with any concerns.    Anat Silva MD, MPH 02/17/23 10:13 AM   General Surgery Resident - [id number]

## 2023-02-22 ENCOUNTER — OFFICE VISIT (OUTPATIENT)
Dept: URGENT CARE | Facility: URGENT CARE | Age: 23
End: 2023-02-22
Payer: COMMERCIAL

## 2023-02-22 VITALS
HEART RATE: 82 BPM | BODY MASS INDEX: 24.41 KG/M2 | DIASTOLIC BLOOD PRESSURE: 86 MMHG | SYSTOLIC BLOOD PRESSURE: 130 MMHG | TEMPERATURE: 98.3 F | WEIGHT: 180 LBS | RESPIRATION RATE: 16 BRPM | OXYGEN SATURATION: 99 %

## 2023-02-22 DIAGNOSIS — R30.0 DYSURIA: Primary | ICD-10-CM

## 2023-02-22 LAB
ALBUMIN UR-MCNC: NEGATIVE MG/DL
APPEARANCE UR: CLEAR
BILIRUB UR QL STRIP: NEGATIVE
COLOR UR AUTO: YELLOW
GLUCOSE UR STRIP-MCNC: NEGATIVE MG/DL
HGB UR QL STRIP: NEGATIVE
KETONES UR STRIP-MCNC: NEGATIVE MG/DL
LEUKOCYTE ESTERASE UR QL STRIP: NEGATIVE
NITRATE UR QL: NEGATIVE
PH UR STRIP: 6 [PH] (ref 5–7)
SP GR UR STRIP: <=1.005 (ref 1–1.03)
UROBILINOGEN UR STRIP-ACNC: 0.2 E.U./DL

## 2023-02-22 PROCEDURE — 99202 OFFICE O/P NEW SF 15 MIN: CPT | Performed by: FAMILY MEDICINE

## 2023-02-22 PROCEDURE — 81003 URINALYSIS AUTO W/O SCOPE: CPT | Performed by: FAMILY MEDICINE

## 2023-02-22 NOTE — PROGRESS NOTES
Assessment & Plan     Dysuria  UA completely clear. Declines STD testing, reports no concern for possible exposure. He does have mild erythema around glans, could simply be inflamed skin that is irritated and burns when in contact w urine. Trial of 1% hydrocortisone to glans around urethral meatus bid x 5-7 days and follow up if not improving (sooner if worsening or new symptoms).  - UA macro with reflex to Microscopic and Culture - Clinc Collect                   No follow-ups on file.    Shanell Chavez MD  [facility]LISSETTE Morgan is a 22 year old, presenting for the following health issues:  UTI (Pt has been having dysuria X 3 days )      HPI   Dysuria only at start of stream at urethral meatus. No redness, no discharge. No hematuria.  No flank pain, no fever. Feels otherwise well and healthy. Pushing fluids, which helps. Denies concern for STD.     Lap venkatesh and ERCP. Post op urinary retention, started on flomax 2/4 for one week. Symptoms resolved.           Review of Systems         Objective    /86   Pulse 82   Temp 98.3  F (36.8  C) (Tympanic)   Resp 16   Wt 81.6 kg (180 lb)   SpO2 99%   BMI 24.41 kg/m    Body mass index is 24.41 kg/m .  Physical Exam   GENERAL: healthy, alert and no distress  EYES: Eyes grossly normal to inspection, conjunctivae and sclerae normal  HENT: mouth clear  RESP: lungs clear to auscultation - no rales, rhonchi or wheezes  CV: regular rate and rhythm, normal S1 S2, no S3 or S4, no murmur, click or rub, no peripheral edema  ABDOMEN: soft, nontender  Back: no cva tenderness  Genitalia: circumcised. Normal urethral meatus. Normal glans, with exception of perhaps slight erythema surrounding urethral meatus without swelling.   MS: no gross musculoskeletal defects noted, no edema

## 2023-05-21 ENCOUNTER — HEALTH MAINTENANCE LETTER (OUTPATIENT)
Age: 23
End: 2023-05-21

## 2024-07-28 ENCOUNTER — HEALTH MAINTENANCE LETTER (OUTPATIENT)
Age: 24
End: 2024-07-28

## 2024-07-30 ENCOUNTER — APPOINTMENT (OUTPATIENT)
Dept: CT IMAGING | Facility: CLINIC | Age: 24
End: 2024-07-30
Attending: EMERGENCY MEDICINE
Payer: COMMERCIAL

## 2024-07-30 ENCOUNTER — HOSPITAL ENCOUNTER (EMERGENCY)
Facility: CLINIC | Age: 24
Discharge: HOME | End: 2024-07-30
Attending: EMERGENCY MEDICINE | Admitting: EMERGENCY MEDICINE
Payer: COMMERCIAL

## 2024-07-30 VITALS
OXYGEN SATURATION: 96 % | BODY MASS INDEX: 26.68 KG/M2 | HEART RATE: 58 BPM | DIASTOLIC BLOOD PRESSURE: 67 MMHG | HEIGHT: 72 IN | TEMPERATURE: 97.5 F | SYSTOLIC BLOOD PRESSURE: 145 MMHG | RESPIRATION RATE: 16 BRPM | WEIGHT: 197 LBS

## 2024-07-30 DIAGNOSIS — R10.13 EPIGASTRIC PAIN: ICD-10-CM

## 2024-07-30 LAB
ALBUMIN SERPL BCG-MCNC: 5 G/DL (ref 3.5–5.2)
ALBUMIN UR-MCNC: 50 MG/DL
ALP SERPL-CCNC: 100 U/L (ref 40–150)
ALT SERPL W P-5'-P-CCNC: 62 U/L (ref 0–70)
ANION GAP SERPL CALCULATED.3IONS-SCNC: 11 MMOL/L (ref 7–15)
APPEARANCE UR: CLEAR
AST SERPL W P-5'-P-CCNC: 62 U/L (ref 0–45)
BASOPHILS # BLD AUTO: 0 10E3/UL (ref 0–0.2)
BASOPHILS NFR BLD AUTO: 0 %
BILIRUB SERPL-MCNC: 1.5 MG/DL
BILIRUB UR QL STRIP: NEGATIVE
BUN SERPL-MCNC: 10.7 MG/DL (ref 6–20)
CALCIUM SERPL-MCNC: 9.8 MG/DL (ref 8.8–10.4)
CHLORIDE SERPL-SCNC: 103 MMOL/L (ref 98–107)
COLOR UR AUTO: YELLOW
CREAT SERPL-MCNC: 1.23 MG/DL (ref 0.67–1.17)
CRP SERPL-MCNC: <3 MG/L
EGFRCR SERPLBLD CKD-EPI 2021: 84 ML/MIN/1.73M2
EOSINOPHIL # BLD AUTO: 0 10E3/UL (ref 0–0.7)
EOSINOPHIL NFR BLD AUTO: 1 %
ERYTHROCYTE [DISTWIDTH] IN BLOOD BY AUTOMATED COUNT: 12.5 % (ref 10–15)
GLUCOSE SERPL-MCNC: 95 MG/DL (ref 70–99)
GLUCOSE UR STRIP-MCNC: NEGATIVE MG/DL
HCO3 SERPL-SCNC: 28 MMOL/L (ref 22–29)
HCT VFR BLD AUTO: 45.3 % (ref 40–53)
HGB BLD-MCNC: 15.3 G/DL (ref 13.3–17.7)
HGB UR QL STRIP: NEGATIVE
HOLD SPECIMEN: NORMAL
HOLD SPECIMEN: NORMAL
IMM GRANULOCYTES # BLD: 0 10E3/UL
IMM GRANULOCYTES NFR BLD: 0 %
KETONES UR STRIP-MCNC: 20 MG/DL
LACTATE SERPL-SCNC: 0.8 MMOL/L (ref 0.7–2)
LEUKOCYTE ESTERASE UR QL STRIP: NEGATIVE
LIPASE SERPL-CCNC: 28 U/L (ref 13–60)
LYMPHOCYTES # BLD AUTO: 1.6 10E3/UL (ref 0.8–5.3)
LYMPHOCYTES NFR BLD AUTO: 28 %
MCH RBC QN AUTO: 27.9 PG (ref 26.5–33)
MCHC RBC AUTO-ENTMCNC: 33.8 G/DL (ref 31.5–36.5)
MCV RBC AUTO: 83 FL (ref 78–100)
MONOCYTES # BLD AUTO: 0.4 10E3/UL (ref 0–1.3)
MONOCYTES NFR BLD AUTO: 7 %
MUCOUS THREADS #/AREA URNS LPF: PRESENT /LPF
NEUTROPHILS # BLD AUTO: 3.6 10E3/UL (ref 1.6–8.3)
NEUTROPHILS NFR BLD AUTO: 63 %
NITRATE UR QL: NEGATIVE
NRBC # BLD AUTO: 0 10E3/UL
NRBC BLD AUTO-RTO: 0 /100
PH UR STRIP: 6.5 [PH] (ref 5–7)
PLATELET # BLD AUTO: 244 10E3/UL (ref 150–450)
POTASSIUM SERPL-SCNC: 4.2 MMOL/L (ref 3.4–5.3)
PROT SERPL-MCNC: 7.9 G/DL (ref 6.4–8.3)
RBC # BLD AUTO: 5.48 10E6/UL (ref 4.4–5.9)
RBC URINE: 1 /HPF
SODIUM SERPL-SCNC: 142 MMOL/L (ref 135–145)
SP GR UR STRIP: 1.02 (ref 1–1.03)
UROBILINOGEN UR STRIP-MCNC: 4 MG/DL
WBC # BLD AUTO: 5.7 10E3/UL (ref 4–11)
WBC URINE: <1 /HPF

## 2024-07-30 PROCEDURE — 82310 ASSAY OF CALCIUM: CPT | Performed by: EMERGENCY MEDICINE

## 2024-07-30 PROCEDURE — 250N000011 HC RX IP 250 OP 636: Performed by: EMERGENCY MEDICINE

## 2024-07-30 PROCEDURE — 80053 COMPREHEN METABOLIC PANEL: CPT | Performed by: EMERGENCY MEDICINE

## 2024-07-30 PROCEDURE — 96376 TX/PRO/DX INJ SAME DRUG ADON: CPT

## 2024-07-30 PROCEDURE — 36415 COLL VENOUS BLD VENIPUNCTURE: CPT | Performed by: EMERGENCY MEDICINE

## 2024-07-30 PROCEDURE — 83605 ASSAY OF LACTIC ACID: CPT | Performed by: EMERGENCY MEDICINE

## 2024-07-30 PROCEDURE — 96375 TX/PRO/DX INJ NEW DRUG ADDON: CPT

## 2024-07-30 PROCEDURE — 96361 HYDRATE IV INFUSION ADD-ON: CPT

## 2024-07-30 PROCEDURE — 86140 C-REACTIVE PROTEIN: CPT | Performed by: EMERGENCY MEDICINE

## 2024-07-30 PROCEDURE — 250N000009 HC RX 250: Performed by: EMERGENCY MEDICINE

## 2024-07-30 PROCEDURE — 258N000003 HC RX IP 258 OP 636: Performed by: EMERGENCY MEDICINE

## 2024-07-30 PROCEDURE — 74177 CT ABD & PELVIS W/CONTRAST: CPT

## 2024-07-30 PROCEDURE — 83690 ASSAY OF LIPASE: CPT | Performed by: EMERGENCY MEDICINE

## 2024-07-30 PROCEDURE — 81003 URINALYSIS AUTO W/O SCOPE: CPT | Performed by: EMERGENCY MEDICINE

## 2024-07-30 PROCEDURE — 99285 EMERGENCY DEPT VISIT HI MDM: CPT | Mod: 25

## 2024-07-30 PROCEDURE — 250N000013 HC RX MED GY IP 250 OP 250 PS 637: Performed by: EMERGENCY MEDICINE

## 2024-07-30 PROCEDURE — 82247 BILIRUBIN TOTAL: CPT | Performed by: EMERGENCY MEDICINE

## 2024-07-30 PROCEDURE — 85025 COMPLETE CBC W/AUTO DIFF WBC: CPT | Performed by: EMERGENCY MEDICINE

## 2024-07-30 PROCEDURE — 96374 THER/PROPH/DIAG INJ IV PUSH: CPT | Mod: 59

## 2024-07-30 RX ORDER — ONDANSETRON 2 MG/ML
4 INJECTION INTRAMUSCULAR; INTRAVENOUS ONCE
Status: COMPLETED | OUTPATIENT
Start: 2024-07-30 | End: 2024-07-30

## 2024-07-30 RX ORDER — KETOROLAC TROMETHAMINE 15 MG/ML
15 INJECTION, SOLUTION INTRAMUSCULAR; INTRAVENOUS ONCE
Status: COMPLETED | OUTPATIENT
Start: 2024-07-30 | End: 2024-07-30

## 2024-07-30 RX ORDER — HYDROMORPHONE HYDROCHLORIDE 1 MG/ML
0.5 INJECTION, SOLUTION INTRAMUSCULAR; INTRAVENOUS; SUBCUTANEOUS ONCE
Status: COMPLETED | OUTPATIENT
Start: 2024-07-30 | End: 2024-07-30

## 2024-07-30 RX ORDER — DEXTROSE MONOHYDRATE AND SODIUM CHLORIDE 5; .9 G/100ML; G/100ML
INJECTION, SOLUTION INTRAVENOUS CONTINUOUS
Status: DISCONTINUED | OUTPATIENT
Start: 2024-07-30 | End: 2024-07-30 | Stop reason: HOSPADM

## 2024-07-30 RX ORDER — HYOSCYAMINE SULFATE 0.125 MG
.125-.25 TABLET ORAL EVERY 4 HOURS PRN
Qty: 30 TABLET | Refills: 0 | Status: SHIPPED | OUTPATIENT
Start: 2024-07-30 | End: 2024-07-30

## 2024-07-30 RX ORDER — METOCLOPRAMIDE HYDROCHLORIDE 5 MG/ML
5 INJECTION INTRAMUSCULAR; INTRAVENOUS ONCE
Status: COMPLETED | OUTPATIENT
Start: 2024-07-30 | End: 2024-07-30

## 2024-07-30 RX ORDER — IOPAMIDOL 755 MG/ML
91 INJECTION, SOLUTION INTRAVASCULAR ONCE
Status: COMPLETED | OUTPATIENT
Start: 2024-07-30 | End: 2024-07-30

## 2024-07-30 RX ORDER — HYOSCYAMINE SULFATE 0.125 MG
250 TABLET ORAL ONCE
Status: COMPLETED | OUTPATIENT
Start: 2024-07-30 | End: 2024-07-30

## 2024-07-30 RX ORDER — HYOSCYAMINE SULFATE 0.125 MG
.125-.25 TABLET ORAL EVERY 4 HOURS PRN
Qty: 30 TABLET | Refills: 0 | Status: SHIPPED | OUTPATIENT
Start: 2024-07-30

## 2024-07-30 RX ADMIN — ONDANSETRON 4 MG: 2 INJECTION INTRAMUSCULAR; INTRAVENOUS at 16:26

## 2024-07-30 RX ADMIN — SODIUM CHLORIDE 1000 ML: 9 INJECTION, SOLUTION INTRAVENOUS at 14:29

## 2024-07-30 RX ADMIN — HYOSCYAMINE SULFATE 250 MCG: 0.12 TABLET ORAL at 18:15

## 2024-07-30 RX ADMIN — HYDROMORPHONE HYDROCHLORIDE 0.5 MG: 1 INJECTION, SOLUTION INTRAMUSCULAR; INTRAVENOUS; SUBCUTANEOUS at 14:30

## 2024-07-30 RX ADMIN — METOCLOPRAMIDE 5 MG: 5 INJECTION, SOLUTION INTRAMUSCULAR; INTRAVENOUS at 17:28

## 2024-07-30 RX ADMIN — HYDROMORPHONE HYDROCHLORIDE 0.5 MG: 1 INJECTION, SOLUTION INTRAMUSCULAR; INTRAVENOUS; SUBCUTANEOUS at 17:16

## 2024-07-30 RX ADMIN — DEXTROSE AND SODIUM CHLORIDE: 5; 900 INJECTION, SOLUTION INTRAVENOUS at 17:15

## 2024-07-30 RX ADMIN — SODIUM CHLORIDE 66 ML: 9 INJECTION, SOLUTION INTRAVENOUS at 17:55

## 2024-07-30 RX ADMIN — IOPAMIDOL 91 ML: 755 INJECTION, SOLUTION INTRAVENOUS at 17:55

## 2024-07-30 RX ADMIN — KETOROLAC TROMETHAMINE 15 MG: 15 INJECTION, SOLUTION INTRAMUSCULAR; INTRAVENOUS at 14:30

## 2024-07-30 ASSESSMENT — ACTIVITIES OF DAILY LIVING (ADL)
ADLS_ACUITY_SCORE: 35

## 2024-07-30 ASSESSMENT — COLUMBIA-SUICIDE SEVERITY RATING SCALE - C-SSRS
1. IN THE PAST MONTH, HAVE YOU WISHED YOU WERE DEAD OR WISHED YOU COULD GO TO SLEEP AND NOT WAKE UP?: NO
6. HAVE YOU EVER DONE ANYTHING, STARTED TO DO ANYTHING, OR PREPARED TO DO ANYTHING TO END YOUR LIFE?: NO
2. HAVE YOU ACTUALLY HAD ANY THOUGHTS OF KILLING YOURSELF IN THE PAST MONTH?: NO

## 2024-07-30 NOTE — ED PROVIDER NOTES
Emergency Department Note      History of Present Illness     Chief Complaint  Abdominal Pain    HPI  Eddie Carey is a 23 year old male who presents with abdominal pain. Patient notes that he presented to the [facility] ED. He reports receiving a basic metabolic panel, CBC, abdominal CT scan while being seen by Dr. Deep Baeza. He notes the onset of his symptoms as yesterday when he had a light stomach ache after eating a bowl of grapes that progressed into extreme pain while he was at work. He states that he was given pain medications and Zofran to help alleviate his symptoms of vomiting and abdominal pain. Today, he states that he has sustained his nausea and vomiting. He notes the onset of left upper quadrant abdominal pain he assesses as a five out of ten shortly after eating lunch. He endorses a history of cholecystectomy and loose stool since his procedure a year and a half ago. He denies recent travel or diet changes. He denies a history of chron's disease or ulcerative colitis. He denies diarrhea, bright red blood emission, hematemesis. He denies fevers.     Independent Historian      Review of External Notes  Reviewed patients basic metabolic panel form yesterday at [facility] and noted a blood sugar of 103, white blood cell count of 10.6, and hemoglobin of 14.8. I reviewed patient's CT abdomen findings and noted colonic wall thickening, pericolonic straining with no bowel obstructioin or appendicitis. Reviewed raised findings of panned colitis. Reviewed final diagnosis of abdominal pain, nausea, vomiting. Noted discharge medications of Zofran and Pepto-bismol.     Past Medical History   Medical History and Problem List  Cholecystitis  Choledocholithiasis    Medications  Pepto-bismol  Zofran  Senokot    Surgical History   Cholecystectomy  Sphincterectomy  Physical Exam   Patient Vitals for the past 24 hrs:   BP Temp Pulse Resp SpO2   07/30/24 1348 (!) 164/91 97.5   F (36.4  C) 78 16 97 %     Physical Exam    General: Resting on the ED bed  Head:  The scalp, face, and head appear normal  Eyes:  The pupils are equal, round, and reactive to light    There is no nystagmus    Extraocular muscles are intact    Conjunctivae and sclerae are normal  ENT:    The nose is normal    Pinnae are normal    The oropharynx is normal  Neck:  Normal range of motion    There is no rigidity noted  CV:  Regular rate  Normal underlying rhythm     Normal S1/S2    No pathological murmur detected  Resp:  Lungs are clear    There is no tachypnea    Non-labored    No rales    No wheezing   GI:  Abdomen is soft, there is no rigidity    No distension/tympani    Mild tenderness in the epigastric and left upper quadrant regions. Below abdomen is benign.     Non-surgical without peritoneal features at this time  MS:  Normal muscular tone    Symmetric motor strength    No asymmetric leg swelling, no calf tenderness  Skin:  No acute skin lesions noted  Neuro:  Speech is normal and fluent  There is no aphasia    No focal motor deficits    Cranial nerves are intact  Psych: Awake. Alert    Normal affect  Appropriate interactions    Diagnostics   Lab Results   Labs Ordered and Resulted from Time of ED Arrival to Time of ED Departure   COMPREHENSIVE METABOLIC PANEL - Abnormal       Result Value    Sodium 142      Potassium 4.2      Carbon Dioxide (CO2) 28      Anion Gap 11      Urea Nitrogen 10.7      Creatinine 1.23 (*)     GFR Estimate 84      Calcium 9.8      Chloride 103      Glucose 95      Alkaline Phosphatase 100      AST 62 (*)     ALT 62      Protein Total 7.9      Albumin 5.0      Bilirubin Total 1.5 (*)    ROUTINE UA WITH MICROSCOPIC REFLEX TO CULTURE - Abnormal    Color Urine Yellow      Appearance Urine Clear      Glucose Urine Negative      Bilirubin Urine Negative      Ketones Urine 20 (*)     Specific Gravity Urine 1.020      Blood Urine Negative      pH Urine 6.5      Protein Albumin Urine 50 (*)      Urobilinogen Urine 4.0 (*)     Nitrite Urine Negative      Leukocyte Esterase Urine Negative      Mucus Urine Present (*)     RBC Urine 1      WBC Urine <1     LACTIC ACID WHOLE BLOOD - Normal    Lactic Acid 0.8     CRP INFLAMMATION - Normal    CRP Inflammation <3.00     LIPASE - Normal    Lipase 28     CBC WITH PLATELETS AND DIFFERENTIAL    WBC Count 5.7      RBC Count 5.48      Hemoglobin 15.3      Hematocrit 45.3      MCV 83      MCH 27.9      MCHC 33.8      RDW 12.5      Platelet Count 244      % Neutrophils 63      % Lymphocytes 28      % Monocytes 7      % Eosinophils 1      % Basophils 0      % Immature Granulocytes 0      NRBCs per 100 WBC 0      Absolute Neutrophils 3.6      Absolute Lymphocytes 1.6      Absolute Monocytes 0.4      Absolute Eosinophils 0.0      Absolute Basophils 0.0      Absolute Immature Granulocytes 0.0      Absolute NRBCs 0.0         Imaging  CT Abdomen Pelvis w Contrast   Final Result   IMPRESSION:    1.  Negative CT examination of the abdomen or pelvis.          EKG   No results found for this or any previous visit.    Independent Interpretation by Dr. Combs    ED Course    Medications Administered  Medications - No data to display    Procedures  Procedures     Discussion of Management      Social Determinants of Health adding to complexity of care  None    ED Course  ED Course as of 07/30/24 1904 Tue Jul 30, 2024   1345 I obtained history and examined the patient as noted above     1614 I rechecked the patient and explained findings.     1733 I rechecked the patient and explained findings. Noted that the patient is feeling better with the administered medications.     1844 I rechecked the patient and explained findings.       Medical Decision Making / Diagnosis   CMS Diagnoses: None    MIPS     None    [facility]  Eddie Carey is a 23 year old male presents to the emergency department with abdominal pain.  He is also had some nausea and vomiting.  Patient was seen in the [facility]  emergency department yesterday and underwent normal blood work and a CT scan that was negative for appendicitis but positive for a possible mild colitis.  No diverticulitis.  Patient had a recurrence of abdominal pain today and called the ambulance.  By the time he arrived to the emergency department he was feeling largely improved other than some mild nausea.  This resolved with IV fluids and Zofran.  White count is normal today.  On abdominal examination the patient does not have any focal areas of abdominal tenderness.  He mainly feels a little bit nauseous, no further vomiting, but he notes when the pain comes on it feels like an acute spasm.  No flank pain or back pain, no groin pain or genitourinary pain.  The patient's labs were repeated and look favorable.      We initially plan to send the patient home without further advanced imaging since this was done yesterday and was largely nonacute.  Just prior to discharge the patient had another episode of severe abdominal pain that was short-lived.  He was given another dose of Zofran, a dose of Reglan, 1 more dose of 0.5 mg of Dilaudid, an additional liter of crystalloid this time with D5 normal saline given that his urine came back showing some starvation ketonuria.  The patient was given Levsin 2 tablets orally.  On reassessment at approximately 1845 the patient was feeling much better.  I advised him that his entire workup thus far has been negative, including we ended up doing a repeat CT scan to make sure that there was no additional inflammatory changes within the intestines as previously noted yesterday at [facility].  The CT scan today is negative.  There is no indication of colonic thickening bowel wall thickening, obstruction, pancreatitis, or appendicitis.    The exact etiology of the patient's new onset episodic abdominal pain is unclear.  The differential diagnosis includes occult peptic ulcer disease, occult inflammatory bowel disease, foodborne  illness, irritable bowel syndrome, amongst others.  I will refer the patient to gastroenterology for outpatient consultation if symptoms continue.      Disposition  The patient was discharged.     ICD-10 Codes:    ICD-10-CM    1. Epigastric pain  R10.13            Discharge Medications  New Prescriptions    HYOSCYAMINE (LEVSIN) 0.125 MG TABLET    Take 1-2 tablets (125-250 mcg) by mouth every 4 hours as needed for cramping         Scribe Disclosure:  I, Lauren Hooper, am serving as a scribe at 3:58 PM on 7/30/2024 to document services personally performed by Mayito Combs MD based on my observations and the provider's statements to me.        Mayito Combs MD  07/30/24 6237       Mayito Combs MD  07/30/24 0403

## 2024-07-30 NOTE — DISCHARGE INSTRUCTIONS
Take Zofran as needed for nausea    Take the Levsin (hyoscyamine) as needed for abdominal cramps    Make an appointment to follow-up with gastroenterology if your symptoms continue    Discharge Instructions  Abdominal Pain    Abdominal pain (belly pain) can be caused by many things. Your evaluation today does not show the exact cause for your pain. Your provider today has decided that it is unlikely your pain is due to a life threatening problem, or a problem requiring surgery or hospital admission. Sometimes those problems cannot be found right away, so it is very important that you follow up as directed.  Sometimes only the changes which occur over time allow the cause of your pain to be found.    Generally, every Emergency Department visit should have a follow-up clinic visit with either a primary or a specialty clinic/provider. Please follow-up as instructed by your emergency provider today. With abdominal pain, we often recommend very close follow-up, such as the following day.    ADULTS:  Return to the Emergency Department right away if:    You get an oral temperature above 102oF or as directed by your provider.  You have blood in your stools. This may be bright red or appear as black, tarry stools.    You keep vomiting (throwing up) or cannot drink liquids.  You see blood when you vomit.   You cannot have a bowel movement or you cannot pass gas.  Your stomach gets bloated or bigger.  Your skin or the whites of your eyes look yellow.  You faint.  You have bloody, frequent or painful urination (peeing).  You have new symptoms or anything that worries you.    CHILDREN:  Return to the Emergency Department right away if your child has any of the above-listed symptoms or the following:    Pushes your hand away or screams/cries when his/her belly is touched.  You notice your child is very fussy or weak.  Your child is very tired and is too tired to eat or drink.  Your child is dehydrated.  Signs of dehydration can  be:  Significant change in the amount of wet diapers/urine.  Your infant or child starts to have dry mouth and lips, or no saliva (spit) or tears.    PREGNANT WOMEN:  Return to the Emergency Department right away if you have any of the above-listed symptoms or the following:    You have bleeding, leaking fluid or passing tissue from the vagina.  You have worse pain or cramping, or pain in your shoulder or back.  You have vomiting that will not stop.  You have a temperature of 100oF or more.  Your baby is not moving as much as usual.  You faint.  You get a bad headache with or without eye problems and abdominal pain.  You have a seizure.  You have unusual discharge from your vagina and abdominal pain.    Abdominal pain is pretty common during pregnancy.  Your pain may or may not be related to your pregnancy. You should follow-up closely with your OB provider so they can evaluate you and your baby.  Until you follow-up with your regular provider, do the following:     Avoid sex and do not put anything in your vagina.  Drink clear fluids.  Only take medications approved by your provider.    MORE INFORMATION:    Appendicitis:  A possible cause of abdominal pain in any person who still has their appendix is acute appendicitis. Appendicitis is often hard to diagnose.  Testing does not always rule out early appendicitis or other causes of abdominal pain. Close follow-up with your provider and re-evaluations may be needed to figure out the reason for your abdominal pain.    Follow-up:  It is very important that you make an appointment with your clinic and go to the appointment.  If you do not follow-up with your primary provider, it may result in missing an important development which could result in permanent injury or disability and/or lasting pain.  If there is any problem keeping your appointment, call your provider or return to the Emergency Department.    Medications:  Take your medications as directed by your  "provider today.  Before using over-the-counter medications, ask your provider and make sure to take the medications as directed.  If you have any questions about medications, ask your provider.    Diet:  Resume your normal diet as much as possible, but do not eat fried, fatty or spicy foods while you have pain.  Do not drink alcohol or have caffeine.  Do not smoke tobacco.    Probiotics: If you have been given an antibiotic, you may want to also take a probiotic pill or eat yogurt with live cultures. Probiotics have \"good bacteria\" to help your intestines stay healthy. Studies have shown that probiotics help prevent diarrhea (loose stools) and other intestine problems (including C. diff infection) when you take antibiotics. You can buy these without a prescription in the pharmacy section of the store.     If you were given a prescription for medicine here today, be sure to read all of the information (including the package insert) that comes with your prescription.  This will include important information about the medicine, its side effects, and any warnings that you need to know about.  The pharmacist who fills the prescription can provide more information and answer questions you may have about the medicine.  If you have questions or concerns that the pharmacist cannot address, please call or return to the Emergency Department.       Remember that you can always come back to the Emergency Department if you are not able to see your regular provider in the amount of time listed above, if you get any new symptoms, or if there is anything that worries you.    "

## 2024-07-30 NOTE — ED TRIAGE NOTES
Pt presents with left sided abd pain rated 5/10. Pt seen at [facility] yesterday and was sent home with zofran and told to manage diet. Pt has had nausea and vomiting. Pt took zofran around 1330. Pt still has appendix but no gallbladder.      Triage Assessment (Adult)       Row Name 07/30/24 3244          Triage Assessment    Airway WDL WDL        Respiratory WDL    Respiratory WDL WDL        Cardiac WDL    Cardiac WDL WDL

## 2024-07-31 ENCOUNTER — APPOINTMENT (OUTPATIENT)
Dept: MRI IMAGING | Facility: CLINIC | Age: 24
DRG: 179 | End: 2024-07-31
Attending: STUDENT IN AN ORGANIZED HEALTH CARE EDUCATION/TRAINING PROGRAM
Payer: COMMERCIAL

## 2024-07-31 ENCOUNTER — HOSPITAL ENCOUNTER (INPATIENT)
Facility: CLINIC | Age: 24
LOS: 1 days | Discharge: HOME | DRG: 179 | End: 2024-08-01
Attending: STUDENT IN AN ORGANIZED HEALTH CARE EDUCATION/TRAINING PROGRAM | Admitting: HOSPITALIST
Payer: COMMERCIAL

## 2024-07-31 ENCOUNTER — APPOINTMENT (OUTPATIENT)
Dept: ULTRASOUND IMAGING | Facility: CLINIC | Age: 24
DRG: 179 | End: 2024-07-31
Attending: EMERGENCY MEDICINE
Payer: COMMERCIAL

## 2024-07-31 DIAGNOSIS — R10.13 ABDOMINAL PAIN, EPIGASTRIC: ICD-10-CM

## 2024-07-31 DIAGNOSIS — R17 ELEVATED BILIRUBIN: ICD-10-CM

## 2024-07-31 DIAGNOSIS — R74.01 TRANSAMINITIS: ICD-10-CM

## 2024-07-31 LAB
ALBUMIN SERPL BCG-MCNC: 4.4 G/DL (ref 3.5–5.2)
ALP SERPL-CCNC: 104 U/L (ref 40–150)
ALT SERPL W P-5'-P-CCNC: 542 U/L (ref 0–70)
ANION GAP SERPL CALCULATED.3IONS-SCNC: 8 MMOL/L (ref 7–15)
AST SERPL W P-5'-P-CCNC: 628 U/L (ref 0–45)
BASOPHILS # BLD AUTO: 0 10E3/UL (ref 0–0.2)
BASOPHILS NFR BLD AUTO: 0 %
BILIRUB SERPL-MCNC: 2.3 MG/DL
BUN SERPL-MCNC: 8.2 MG/DL (ref 6–20)
CALCIUM SERPL-MCNC: 9.2 MG/DL (ref 8.8–10.4)
CHLORIDE SERPL-SCNC: 104 MMOL/L (ref 98–107)
CREAT SERPL-MCNC: 1.15 MG/DL (ref 0.67–1.17)
EGFRCR SERPLBLD CKD-EPI 2021: >90 ML/MIN/1.73M2
EOSINOPHIL # BLD AUTO: 0 10E3/UL (ref 0–0.7)
EOSINOPHIL NFR BLD AUTO: 0 %
ERYTHROCYTE [DISTWIDTH] IN BLOOD BY AUTOMATED COUNT: 12.6 % (ref 10–15)
GLUCOSE SERPL-MCNC: 120 MG/DL (ref 70–99)
HCO3 SERPL-SCNC: 28 MMOL/L (ref 22–29)
HCT VFR BLD AUTO: 41.4 % (ref 40–53)
HGB BLD-MCNC: 13.8 G/DL (ref 13.3–17.7)
HOLD SPECIMEN: NORMAL
IMM GRANULOCYTES # BLD: 0 10E3/UL
IMM GRANULOCYTES NFR BLD: 0 %
LIPASE SERPL-CCNC: 29 U/L (ref 13–60)
LYMPHOCYTES # BLD AUTO: 0.6 10E3/UL (ref 0.8–5.3)
LYMPHOCYTES NFR BLD AUTO: 10 %
MCH RBC QN AUTO: 27.9 PG (ref 26.5–33)
MCHC RBC AUTO-ENTMCNC: 33.3 G/DL (ref 31.5–36.5)
MCV RBC AUTO: 84 FL (ref 78–100)
MONOCYTES # BLD AUTO: 0.4 10E3/UL (ref 0–1.3)
MONOCYTES NFR BLD AUTO: 7 %
NEUTROPHILS # BLD AUTO: 4.6 10E3/UL (ref 1.6–8.3)
NEUTROPHILS NFR BLD AUTO: 83 %
NRBC # BLD AUTO: 0 10E3/UL
NRBC BLD AUTO-RTO: 0 /100
PLATELET # BLD AUTO: 195 10E3/UL (ref 150–450)
POTASSIUM SERPL-SCNC: 4.4 MMOL/L (ref 3.4–5.3)
PROT SERPL-MCNC: 6.9 G/DL (ref 6.4–8.3)
RBC # BLD AUTO: 4.95 10E6/UL (ref 4.4–5.9)
SARS-COV-2 RNA RESP QL NAA+PROBE: POSITIVE
SODIUM SERPL-SCNC: 140 MMOL/L (ref 135–145)
WBC # BLD AUTO: 5.5 10E3/UL (ref 4–11)

## 2024-07-31 PROCEDURE — 250N000011 HC RX IP 250 OP 636: Performed by: PHYSICIAN ASSISTANT

## 2024-07-31 PROCEDURE — 80053 COMPREHEN METABOLIC PANEL: CPT | Performed by: EMERGENCY MEDICINE

## 2024-07-31 PROCEDURE — 76705 ECHO EXAM OF ABDOMEN: CPT

## 2024-07-31 PROCEDURE — 258N000003 HC RX IP 258 OP 636: Performed by: PHYSICIAN ASSISTANT

## 2024-07-31 PROCEDURE — 87635 SARS-COV-2 COVID-19 AMP PRB: CPT | Performed by: INTERNAL MEDICINE

## 2024-07-31 PROCEDURE — 86665 EPSTEIN-BARR CAPSID VCA: CPT | Performed by: INTERNAL MEDICINE

## 2024-07-31 PROCEDURE — 80074 ACUTE HEPATITIS PANEL: CPT | Performed by: INTERNAL MEDICINE

## 2024-07-31 PROCEDURE — 120N000001 HC R&B MED SURG/OB

## 2024-07-31 PROCEDURE — 255N000002 HC RX 255 OP 636: Performed by: STUDENT IN AN ORGANIZED HEALTH CARE EDUCATION/TRAINING PROGRAM

## 2024-07-31 PROCEDURE — 85025 COMPLETE CBC W/AUTO DIFF WBC: CPT | Performed by: STUDENT IN AN ORGANIZED HEALTH CARE EDUCATION/TRAINING PROGRAM

## 2024-07-31 PROCEDURE — 36415 COLL VENOUS BLD VENIPUNCTURE: CPT | Performed by: INTERNAL MEDICINE

## 2024-07-31 PROCEDURE — 36415 COLL VENOUS BLD VENIPUNCTURE: CPT | Performed by: EMERGENCY MEDICINE

## 2024-07-31 PROCEDURE — 83690 ASSAY OF LIPASE: CPT | Performed by: EMERGENCY MEDICINE

## 2024-07-31 PROCEDURE — 85025 COMPLETE CBC W/AUTO DIFF WBC: CPT | Performed by: EMERGENCY MEDICINE

## 2024-07-31 PROCEDURE — 74183 MRI ABD W/O CNTR FLWD CNTR: CPT

## 2024-07-31 PROCEDURE — 99285 EMERGENCY DEPT VISIT HI MDM: CPT | Mod: 25

## 2024-07-31 PROCEDURE — A9585 GADOBUTROL INJECTION: HCPCS | Performed by: STUDENT IN AN ORGANIZED HEALTH CARE EDUCATION/TRAINING PROGRAM

## 2024-07-31 PROCEDURE — 99223 1ST HOSP IP/OBS HIGH 75: CPT | Performed by: PHYSICIAN ASSISTANT

## 2024-07-31 PROCEDURE — 80053 COMPREHEN METABOLIC PANEL: CPT | Performed by: STUDENT IN AN ORGANIZED HEALTH CARE EDUCATION/TRAINING PROGRAM

## 2024-07-31 RX ORDER — ONDANSETRON 2 MG/ML
4 INJECTION INTRAMUSCULAR; INTRAVENOUS EVERY 6 HOURS PRN
Status: DISCONTINUED | OUTPATIENT
Start: 2024-07-31 | End: 2024-08-01 | Stop reason: HOSPADM

## 2024-07-31 RX ORDER — NALOXONE HYDROCHLORIDE 0.4 MG/ML
0.2 INJECTION, SOLUTION INTRAMUSCULAR; INTRAVENOUS; SUBCUTANEOUS
Status: DISCONTINUED | OUTPATIENT
Start: 2024-07-31 | End: 2024-08-01 | Stop reason: HOSPADM

## 2024-07-31 RX ORDER — OXYCODONE HYDROCHLORIDE 5 MG/1
5 TABLET ORAL EVERY 4 HOURS PRN
Status: DISCONTINUED | OUTPATIENT
Start: 2024-07-31 | End: 2024-08-01 | Stop reason: HOSPADM

## 2024-07-31 RX ORDER — AMOXICILLIN 250 MG
2 CAPSULE ORAL 2 TIMES DAILY PRN
Status: DISCONTINUED | OUTPATIENT
Start: 2024-07-31 | End: 2024-08-01 | Stop reason: HOSPADM

## 2024-07-31 RX ORDER — PROCHLORPERAZINE 25 MG
25 SUPPOSITORY, RECTAL RECTAL EVERY 12 HOURS PRN
Status: DISCONTINUED | OUTPATIENT
Start: 2024-07-31 | End: 2024-08-01 | Stop reason: HOSPADM

## 2024-07-31 RX ORDER — HYDROMORPHONE HCL IN WATER/PF 6 MG/30 ML
0.2 PATIENT CONTROLLED ANALGESIA SYRINGE INTRAVENOUS
Status: DISCONTINUED | OUTPATIENT
Start: 2024-07-31 | End: 2024-08-01 | Stop reason: HOSPADM

## 2024-07-31 RX ORDER — HYDROMORPHONE HCL IN WATER/PF 6 MG/30 ML
0.4 PATIENT CONTROLLED ANALGESIA SYRINGE INTRAVENOUS
Status: DISCONTINUED | OUTPATIENT
Start: 2024-07-31 | End: 2024-08-01 | Stop reason: HOSPADM

## 2024-07-31 RX ORDER — ONDANSETRON 4 MG/1
4 TABLET, ORALLY DISINTEGRATING ORAL EVERY 6 HOURS PRN
Status: DISCONTINUED | OUTPATIENT
Start: 2024-07-31 | End: 2024-08-01 | Stop reason: HOSPADM

## 2024-07-31 RX ORDER — CALCIUM CARBONATE 500 MG/1
1000 TABLET, CHEWABLE ORAL 4 TIMES DAILY PRN
Status: DISCONTINUED | OUTPATIENT
Start: 2024-07-31 | End: 2024-08-01 | Stop reason: HOSPADM

## 2024-07-31 RX ORDER — GADOBUTROL 604.72 MG/ML
10 INJECTION INTRAVENOUS ONCE
Status: COMPLETED | OUTPATIENT
Start: 2024-07-31 | End: 2024-07-31

## 2024-07-31 RX ORDER — CALCIUM CARBONATE 500 MG/1
500 TABLET, CHEWABLE ORAL 3 TIMES DAILY PRN
Status: CANCELLED | OUTPATIENT
Start: 2024-07-31

## 2024-07-31 RX ORDER — ACETAMINOPHEN 325 MG/1
650 TABLET ORAL EVERY 4 HOURS PRN
Status: DISCONTINUED | OUTPATIENT
Start: 2024-07-31 | End: 2024-07-31

## 2024-07-31 RX ORDER — AMOXICILLIN 250 MG
1 CAPSULE ORAL 2 TIMES DAILY PRN
Status: DISCONTINUED | OUTPATIENT
Start: 2024-07-31 | End: 2024-08-01 | Stop reason: HOSPADM

## 2024-07-31 RX ORDER — PROCHLORPERAZINE MALEATE 10 MG
10 TABLET ORAL EVERY 6 HOURS PRN
Status: DISCONTINUED | OUTPATIENT
Start: 2024-07-31 | End: 2024-08-01 | Stop reason: HOSPADM

## 2024-07-31 RX ORDER — NALOXONE HYDROCHLORIDE 0.4 MG/ML
0.4 INJECTION, SOLUTION INTRAMUSCULAR; INTRAVENOUS; SUBCUTANEOUS
Status: DISCONTINUED | OUTPATIENT
Start: 2024-07-31 | End: 2024-08-01 | Stop reason: HOSPADM

## 2024-07-31 RX ORDER — POLYETHYLENE GLYCOL 3350 17 G/17G
17 POWDER, FOR SOLUTION ORAL 2 TIMES DAILY PRN
Status: DISCONTINUED | OUTPATIENT
Start: 2024-07-31 | End: 2024-08-01 | Stop reason: HOSPADM

## 2024-07-31 RX ORDER — LIDOCAINE 40 MG/G
CREAM TOPICAL
Status: DISCONTINUED | OUTPATIENT
Start: 2024-07-31 | End: 2024-08-01 | Stop reason: HOSPADM

## 2024-07-31 RX ORDER — SODIUM CHLORIDE, SODIUM LACTATE, POTASSIUM CHLORIDE, CALCIUM CHLORIDE 600; 310; 30; 20 MG/100ML; MG/100ML; MG/100ML; MG/100ML
INJECTION, SOLUTION INTRAVENOUS CONTINUOUS
Status: DISCONTINUED | OUTPATIENT
Start: 2024-07-31 | End: 2024-08-01

## 2024-07-31 RX ORDER — ACETAMINOPHEN 650 MG/1
650 SUPPOSITORY RECTAL EVERY 4 HOURS PRN
Status: DISCONTINUED | OUTPATIENT
Start: 2024-07-31 | End: 2024-07-31

## 2024-07-31 RX ADMIN — PANTOPRAZOLE SODIUM 40 MG: 40 INJECTION, POWDER, FOR SOLUTION INTRAVENOUS at 18:11

## 2024-07-31 RX ADMIN — SODIUM CHLORIDE, POTASSIUM CHLORIDE, SODIUM LACTATE AND CALCIUM CHLORIDE: 600; 310; 30; 20 INJECTION, SOLUTION INTRAVENOUS at 12:34

## 2024-07-31 RX ADMIN — SODIUM CHLORIDE, POTASSIUM CHLORIDE, SODIUM LACTATE AND CALCIUM CHLORIDE: 600; 310; 30; 20 INJECTION, SOLUTION INTRAVENOUS at 17:23

## 2024-07-31 RX ADMIN — GADOBUTROL 10 ML: 604.72 INJECTION INTRAVENOUS at 09:21

## 2024-07-31 ASSESSMENT — ACTIVITIES OF DAILY LIVING (ADL)
ADLS_ACUITY_SCORE: 35
ADLS_ACUITY_SCORE: 33
ADLS_ACUITY_SCORE: 18
ADLS_ACUITY_SCORE: 18
ADLS_ACUITY_SCORE: 35
ADLS_ACUITY_SCORE: 19
ADLS_ACUITY_SCORE: 35
ADLS_ACUITY_SCORE: 35
ADLS_ACUITY_SCORE: 18
ADLS_ACUITY_SCORE: 18
ADLS_ACUITY_SCORE: 35
ADLS_ACUITY_SCORE: 18
ADLS_ACUITY_SCORE: 35
ADLS_ACUITY_SCORE: 35

## 2024-07-31 NOTE — ED NOTES
[facility]  ED Nurse Handoff Report    ED Chief complaint: Abdominal Pain      ED Diagnosis:   Final diagnoses:   Transaminitis   Elevated bilirubin       Code Status: Full Code    Allergies: No Known Allergies    Patient Story: abdominal pain  Focused Assessment:  Patient with abdominal pain and nausea vomiting diarrhea for 3 days, has elevated LFTs requiring US and MRCP. Will be admitted for further evaluation and treatment.     Treatments and/or interventions provided: see MAR  Patient's response to treatments and/or interventions: TBD    To be done/followed up on inpatient unit:  close monitoring    Does this patient have any cognitive concerns?:  na    Activity level - Baseline/Home:  Independent  Activity Level - Current:   Independent    Patient's Preferred language: English   Needed?: No    Isolation: None  Infection: Not Applicable  Patient tested for COVID 19 prior to admission: NO  Bariatric?: No    Vital Signs:   Vitals:    07/31/24 0316 07/31/24 0318   BP:  (!) 154/76   Pulse: 57    Resp: 16    Temp: 97.8  F (36.6  C)    TempSrc: Oral    SpO2: 97%        Cardiac Rhythm:Cardiac Rhythm: Sinus bradycardia    Was the PSS-3 completed:   Yes  What interventions are required if any?               Family Comments: family at bedside  OBS brochure/video discussed/provided to patient/family: Yes              Name of person given brochure if not patient: na              Relationship to patient: na    For the majority of the shift this patient's behavior was Green.   Behavioral interventions performed were none.    ED NURSE PHONE NUMBER: **[id number]

## 2024-07-31 NOTE — PHARMACY-ADMISSION MEDICATION HISTORY
Pharmacist Admission Medication History    Admission medication history is complete. The information provided in this note is only as accurate as the sources available at the time of the update.    Information Source(s): Patient and CareEverywhere/SureScripts via in-person    Changes made to PTA medication list:  Added: Pepto  Deleted: senna, tamsulosin  Changed: None    Allergies reviewed with patient and updates made in EHR: no    Medication History Completed By: Ling Osborne RP 7/31/2024 8:54 AM    PTA Med List   Medication Sig Last Dose    bismuth subsalicylate (PEPTO BISMOL) 262 MG/15ML suspension Take 15 mLs by mouth every 4 hours as needed for indigestion 7/30/2024    hyoscyamine (LEVSIN) 0.125 MG tablet Take 1-2 tablets (125-250 mcg) by mouth every 4 hours as needed for cramping 7/30/2024    ondansetron (ZOFRAN ODT) 4 MG ODT tab Take 1 tablet (4 mg) by mouth every 6 hours as needed for nausea 7/30/2024

## 2024-07-31 NOTE — PLAN OF CARE
[id number]: Pt A&Ox4; VSS except for elevated BP. On RA. Denies any shortness of breath. C/o mild abdominal discomfort which pt declines intervention for. On clear liquids; no c/o nausea or vomiting. IVF infusing at 125 ml/hr. Special precautions maintained. Pt transferred to station 66. Belongings sent with pt. Will continue to monitor.

## 2024-07-31 NOTE — H&P
[facility]    History and Physical - Hospitalist Service       Date of Admission:  7/31/2024    Assessment & Plan   Eddie Carey is a 23 year old male with hx of cholecystitis and choledocholithiasis s/p lap cholecystectomy and ERCP (1/2023) who presented to the ED for the third time in 3 days for further evaluation of postprandial epigastric pain, found to have elevated transaminases and bilirubin. Admitted on 7/31/2024 for GI consultation and further evaluation.     Elevated transaminases and bilirubin, rule out choledocholithiasis  S/p cholecystectomy (2023)  Hepatic steatosis:   *On admission, , , Tbili 2.3; significantly changed from two days prior. CT A/P from 7/29 with question of mild pancolitis, repeat CT 7/30 negative for acute pathology. RUQ U/S on 7/31 with hepatic steatosis, otherwise unremarkable.   - Admit to inpatient status   - [facility] consulted, recommend MRCP which has been ordered in the ED   -  ccs/hr   - IV Protonix every day   - Analgesic and antiemetic regimen in place   - NPO status         Diet: NPO for Medical/Clinical Reasons Except for: No Exceptions    DVT Prophylaxis: Pneumatic Compression Devices and Ambulate every shift  Aleman Catheter: Not present  Lines: None     Cardiac Monitoring: None  Code Status:  Full Code     Clinically Significant Risk Factors Present on Admission                          # Overweight: Estimated body mass index is 26.72 kg/m  as calculated from the following:    Height as of 7/30/24: 1.829 m (6').    Weight as of 7/30/24: 89.4 kg (197 lb).              Disposition Plan     Medically Ready for Discharge: Anticipated in 2-4 Days     The patient's care was discussed with the Attending Physician, Dr. Mcnulty, Bedside Nurse, Patient, and Patient's Family.    Yasemin Philip PA-C  Hospitalist Service  [facility]  Securely message with [employer] (more info)  Text page via  [facility] Paging/Directory     ______________________________________________________________________    Chief Complaint   Postprandial epigastric pain    History is obtained from the patient    History of Present Illness   Eddie Carey is a 23 year old male with hx of cholecystitis and choledocholithiasis s/p lap cholecystectomy and ERCP (1/2023) who presented to the ED for the third time in 3 days for further evaluation of postprandial epigastric pain, found to have elevated transaminases and bilirubin. Admitted on 7/31/2024 for GI consultation and further evaluation.     Initially seen at [facility] 7/29. Sx started on that day while at work described as lower abdominal pain that worsened ~1530 while sitting at his desk at work. Had associated N/V at that time. EMS was called. BMP was unremarkable. CBC was 10.6. CT notes question of a mild pancolitis. Clinical correlation would be beneficial. Normal CT appearance of the appendix. Discharged with pepto-bismol and zofran. Re-presented to [facility] 7/30 due to persistent N/V and abdominal pain, now localized to LUQ after eating lunch. He was provided with antiemetics, analgesics and IVF. CMP with mild LARISA of 1.23, AST 62, Tbili 1.5. CBC WNL. UA with starvation ketosis. CT A/P negative for acute pathology.     Pt now presents for the third time in 3 days for ongoing postprandial epigastric pain. Denies N/V or change in stool pattern (looser post cholecystectomy). No melena or hematochezia. He was seen by Dr. Horton and found to have significantly elevated transaminases with  and , Tbili 2.3. CBC WNL. RUQ with hepatic steatosis. [facility] was contacted and recommended MRCP.     During my interview, pt confirms the above hx. Currently he has minimal pain, but reports even water significantly worsens the pain. It feels similar to his prior episode of choledocholithiasis. He drinks alcohol 2X/week. No tobacco or recreational drug use. No significant NSAID use.  Wife notes question of slight jaundice in his arms. No pruritus.  No recent travel. Ate at [employer] over the weekend. No personal or family hx of hepatitis.     Past Medical History    Cholecystitis s/p lap venkatesh   Choledocholithiasis s/p ERCP    Past Surgical History   Past Surgical History:   Procedure Laterality Date    ENDOSCOPIC RETROGRADE CHOLANGIOPANCREATOGRAM N/A 2/3/2023    Procedure: ENDOSCOPIC RETROGRADE CHOLANGIOPANCREATOGRAPHY, WITH SPHINCTEROTOMY;  Surgeon: Elise Nathan MD;  Location:  OR    ENDOSCOPIC ULTRASOUND UPPER GASTROINTESTINAL TRACT (GI) N/A 2/3/2023    Procedure: ENDOSCOPIC ULTRASOUND, ESOPHAGOSCOPY / UPPER GASTROINTESTINAL TRACT (GI);  Surgeon: Elise Nathan MD;  Location:  OR    LAPAROSCOPIC CHOLECYSTECTOMY N/A 2/1/2023    Procedure: laparoscopic cholecystectomy;  Surgeon: Feliz Gar MD;  Location:  OR       Prior to Admission Medications   Prior to Admission Medications   Prescriptions Last Dose Informant Patient Reported? Taking?   hyoscyamine (LEVSIN) 0.125 MG tablet   No No   Sig: Take 1-2 tablets (125-250 mcg) by mouth every 4 hours as needed for cramping   ondansetron (ZOFRAN ODT) 4 MG ODT tab   No No   Sig: Take 1 tablet (4 mg) by mouth every 6 hours as needed for nausea   senna (SENOKOT) 8.6 MG tablet   No No   Sig: Take 1 tablet by mouth 2 times daily   tamsulosin (FLOMAX) 0.4 MG capsule   No No   Sig: Take 1 capsule (0.4 mg) by mouth daily      Facility-Administered Medications: None        Review of Systems    The 10 point Review of Systems is negative other than noted in the HPI.    Social History   Drinks alcohol 2X/week. No tobacco or recreational drug use.     Family History   Reviewed and noncontributory to current chief complaint.     Allergies   No Known Allergies     Physical Exam   Vital Signs: Temp: 97.8  F (36.6  C) Temp src: Oral BP: (!) 154/76 Pulse: 57   Resp: 16 SpO2: 97 %      Weight: 0 lbs 0 oz    CONSTITUTIONAL: Pt  laying in bed, dressed in hospital garb. Appears comfortable. Cooperative with interview. Accompanied by wife at bedside.   HEENT: Normocephalic, atraumatic.  CARDIOVASCULAR: RRR, no murmurs, rubs, or extra heart sounds appreciated. Pulses +2/4 and regular in upper and lower extremities, bilaterally.   RESPIRATORY: No increased work of breathing. CTA, bilat; no wheezes, rales, or rhonchi appreciated.  GASTROINTESTINAL:  Abdomen soft, non-distended. BS auscultated in all four quadrants. Mild epigastric TTP.   MUSCULOSKELETAL: No gross deformities noted. Normal muscle tone.   HEMATOLOGIC/LYMPHATIC/IMMUNOLOGIC: Negative for lower extremity edema, bilaterally.  NEUROLOGIC: Alert and oriented to person, place, and time. No focal neuro deficits.   SKIN: Warm, dry, intact.     Medical Decision Making       75 MINUTES SPENT BY ME on the date of service doing chart review, history, exam, documentation & further activities per the note.      Data     I have personally reviewed the following data over the past 24 hrs:    5.5  \   13.8   / 195     140 104 8.2 /  120 (H)   4.4 28 1.15 \     ALT: 542 (HH) AST: 628 (HH) AP: 104 TBILI: 2.3 (H)   ALB: 4.4 TOT PROTEIN: 6.9 LIPASE: 29     Procal: N/A CRP: <3.00 Lactic Acid: 0.8         Imaging results reviewed over the past 24 hrs:   Recent Results (from the past 24 hour(s))   CT Abdomen Pelvis w Contrast    Narrative    EXAM: CT ABDOMEN PELVIS W CONTRAST  LOCATION: [facility]  DATE: 7/30/2024    INDICATION: Recurrent severe abdominal pain for the last 2 days.  Possible colitis seen on a CT at [facility] yesterday.  Ongoing intermittent pain in the upper abdomen.  Intra abdominal infection?  Obstruction?  History cholecystectomy    COMPARISON: None.    TECHNIQUE: CT scan of the abdomen and pelvis was performed following injection of IV contrast. Multiplanar reformats were obtained. Dose reduction techniques were used.  CONTRAST: 91mL Isovue 370    FINDINGS:    LOWER CHEST: Normal.    HEPATOBILIARY: Prior cholecystectomy. Liver unremarkable.    PANCREAS: Normal.    SPLEEN: Normal.    ADRENAL GLANDS: Normal.    KIDNEYS/BLADDER: Normal.    BOWEL: No free air, free fluid, or inflammatory change. Small bowel normal in caliber. No bowel wall thickening or abnormal enhancement. Normal caliber appendix.    LYMPH NODES: Normal.    VASCULATURE: Normal.    PELVIC ORGANS: Normal.    MUSCULOSKELETAL: Normal.      Impression    IMPRESSION:   1.  Negative CT examination of the abdomen or pelvis.   US Abdomen Limited (RUQ)    Narrative    EXAM: US ABDOMEN LIMITED  LOCATION: [facility]  DATE: 7/31/2024    INDICATION: epigastric pain, elevated LFTs  COMPARISON: CT from 7/30/2024  TECHNIQUE: Limited abdominal ultrasound.    FINDINGS:    GALLBLADDER: Surgically removed.    BILE DUCTS: No biliary dilatation. The common duct measures 6 mm.    LIVER: Increased echogenicity from diffuse fatty infiltration. No focal mass.    RIGHT KIDNEY: No hydronephrosis.    PANCREAS: The visualized portions are normal.    No ascites.      Impression    IMPRESSION:  1.  Hepatic steatosis.

## 2024-07-31 NOTE — ED PROVIDER NOTES
Emergency Department Note      History of Present Illness     Chief Complaint   Abdominal Pain      HPI   Eddie Carey is a 23 year old male who presents at the emergency department for evaluation of abdominal pain. The patient reports that 3 days ago he developed extreme centralized upper abdominal pain. He explains that his sharp abdominal pain waxes and wanes, although eating intensifies the pain. Eddie states his pain was 9/10 when he initially arrived at the emergency department, but has resolved to a mild discomfort. He notes taking 3 ibuprofen around midnight today, but denies taking tylenol. The patient denies radiation of pain. He endorses vomiting, nausea, and loss of appetite due to his pain. The patient denies fever, diarrhea, or urinary/bowel troubles. Eddie denies recent travel, or IV drug use. Patient denies drinking alcohol. Of note, patient had gallbladder removed in February 2023.      Independent Historian   None    Review of External Notes   Reviewed emergency department note from yesterday.     Past Medical History     Medical History and Problem List   Cholecystitis   Transaminitis  Choledocholithiasis  Varicella    Medications   Levsin  Zofran  Senokot  Flomax    Surgical History   Endoscopic retrograde cholangiopancreatogram  Endoscopic ultrasound upper GI  Cholecystectomy    Physical Exam     Patient Vitals for the past 24 hrs:   BP Temp Temp src Pulse Resp SpO2   07/31/24 0803 -- -- -- -- -- 98 %   07/31/24 0802 121/70 -- -- (!) 49 -- --   07/31/24 0318 (!) 154/76 -- -- -- -- --   07/31/24 0316 -- 97.8  F (36.6  C) Oral 57 16 97 %     Physical Exam  GENERAL: Patient well-appearing  HEAD: Atraumatic.  NECK: No rigidity  CV: RRR, no murmurs, rubs or gallops  PULM: CTAB with good aeration; no retractions, rales, rhonchi, or wheezing  ABD: Soft, nondistended, no guarding. Mild tenderness to palpation of LUQ.  DERM: No rash. Skin warm and dry  EXTREMITY: Moving all extremities  without difficulty. No calf tenderness or peripheral edema         Diagnostics     Lab Results   Labs Ordered and Resulted from Time of ED Arrival to Time of ED Departure   COMPREHENSIVE METABOLIC PANEL - Abnormal       Result Value    Sodium 140      Potassium 4.4      Carbon Dioxide (CO2) 28      Anion Gap 8      Urea Nitrogen 8.2      Creatinine 1.15      GFR Estimate >90      Calcium 9.2      Chloride 104      Glucose 120 (*)     Alkaline Phosphatase 104       (*)      (*)     Protein Total 6.9      Albumin 4.4      Bilirubin Total 2.3 (*)    CBC WITH PLATELETS AND DIFFERENTIAL - Abnormal    WBC Count 5.5      RBC Count 4.95      Hemoglobin 13.8      Hematocrit 41.4      MCV 84      MCH 27.9      MCHC 33.3      RDW 12.6      Platelet Count 195      % Neutrophils 83      % Lymphocytes 10      % Monocytes 7      % Eosinophils 0      % Basophils 0      % Immature Granulocytes 0      NRBCs per 100 WBC 0      Absolute Neutrophils 4.6      Absolute Lymphocytes 0.6 (*)     Absolute Monocytes 0.4      Absolute Eosinophils 0.0      Absolute Basophils 0.0      Absolute Immature Granulocytes 0.0      Absolute NRBCs 0.0     LIPASE - Normal    Lipase 29     COVID-19 VIRUS (CORONAVIRUS) BY PCR   EBV CAPSID ANTIBODY IGM   ACUTE HEPATITIS PANEL       Imaging   MR Abdomen MRCP w/o & w Contrast   Final Result   IMPRESSION:    1. No evidence for choledocholithiasis.    2. No acute process demonstrated.      CANDACE VELASCO MD            SYSTEM ID:  [id number]      US Abdomen Limited (RUQ)   Final Result   IMPRESSION:   1.  Hepatic steatosis.                Independent Interpretation   None    ED Course      Medications Administered   Medications   lactated ringers infusion ( Intravenous $New Bag 7/31/24 6121)   sodium chloride (PF) 0.9% PF flush 100 mL (100 mLs Intravenous $Given 7/31/24 2394)   gadobutrol (GADAVIST) injection 10 mL (10 mLs Intravenous $Given 7/31/24 9449)       Discussion of Management   Admitting  Hospitalist, Dr. Mcnulty    ED Course   ED Course as of 07/31/24 1243   Wed Jul 31, 2024   0646 I obtained history and examined the patient as noted above     0709 I spoke with HUNG Cohn to discuss patient's plan of care.   0803 I spoke with Yasemin Philip to discuss the patient's plan of care. She accepts the patient on behalf of Dr. Mcnulty.       Optional/Additional Documentation  None    Medical Decision Making / Diagnosis     CMS Diagnoses: None    MIPS       None    [facility]   Eddie Carey is a 23 year old male with history of cholecystectomy, presenting with recurrent intermittent epigastric abdominal pain and vomiting.  Differential diagnosis-considered obstruction, cholangitis, choledocholithiasis, and others.  When I assessed him, he is well-appearing.  His abdominal pain had improved.  Overall reassuring abdominal assessment.  Labs did show now transaminitis.  Furthermore his bilirubin bumped.  Therefore, consulted gastroenterology who recommends MRCP and admission and they will see patient as an inpatient.  I discussed with hospitalist and patient to be admitted.  MRCP returned reassuring.  Patient denies any IV drug use.  No foreign travel.  No diarrhea.  He is not jaundiced.  Hepatitis seems less likely.  He does not drink alcohol regularly.  He has not been using Tylenol.  Ultimately, patient would benefit from further inpatient evaluation.    Disposition   The patient was admitted to the hospital.     Diagnosis     ICD-10-CM    1. Transaminitis  R74.01       2. Elevated bilirubin  R17       3. Abdominal pain, epigastric  R10.13            Discharge Medications   New Prescriptions    No medications on file         Scribe Disclosure:  I, Savannah Roman, am serving as a scribe at 8:11 AM on 7/31/2024 to document services personally performed by Pete Horton MD based on my observations and the provider's statements to me.        Pete Horton MD  07/31/24 3756

## 2024-07-31 NOTE — CONSULTS
[facility] CONSULTATION     Eddie Carey  [address] [place] [address]  23 year old male    Admission Date/Time: 7/31/2024  Primary Care Provider: No Ref-Primary, Physician    We were asked to see the patient in consultation by Dr. Reagan for evaluation of abdominal  pain and elevated LFTs.        HPI:  Eddie Carey is a 23 year old male who presented to [facility] 7/31/24 with several days of severe, episodic upper abdominal pain with vomiting.  On admission , , Bili 2.3, alk phos 104.  CBC normal.  Denies diarrhea.    CT, US and MRCP unremarkable.    Patient has history of cholecystitis in 2023 when he had a cholecystectomy. He also had choledocholithiasis and underwent ERCP with sphincterotomy and stone extraction  1/2023. This pain reminds him of the pain he had when he needed his gallbladder out.    Drinks alcohol about 2 days weekly,denies illicit drug use.  Non smoker.    ROS: A comprehensive ten point review of systems was negative aside from those in mentioned in the HPI.      MEDICATIONS:   Current Facility-Administered Medications   Medication Dose Route Frequency Provider Last Rate Last Admin    lactated ringers infusion   Intravenous Continuous Yasemin Philip PA-C         Current Outpatient Medications   Medication Sig Dispense Refill    bismuth subsalicylate (PEPTO BISMOL) 262 MG/15ML suspension Take 15 mLs by mouth every 4 hours as needed for indigestion      hyoscyamine (LEVSIN) 0.125 MG tablet Take 1-2 tablets (125-250 mcg) by mouth every 4 hours as needed for cramping 30 tablet 0    ondansetron (ZOFRAN ODT) 4 MG ODT tab Take 1 tablet (4 mg) by mouth every 6 hours as needed for nausea 10 tablet 0       ALLERGIES: No Known Allergies    No past medical history on file.    Past Surgical History:   Procedure Laterality Date    ENDOSCOPIC RETROGRADE CHOLANGIOPANCREATOGRAM N/A 2/3/2023    Procedure: ENDOSCOPIC RETROGRADE  CHOLANGIOPANCREATOGRAPHY, WITH SPHINCTEROTOMY;  Surgeon: Elise Nathan MD;  Location:  OR    ENDOSCOPIC ULTRASOUND UPPER GASTROINTESTINAL TRACT (GI) N/A 2/3/2023    Procedure: ENDOSCOPIC ULTRASOUND, ESOPHAGOSCOPY / UPPER GASTROINTESTINAL TRACT (GI);  Surgeon: Elise Nathan MD;  Location:  OR    LAPAROSCOPIC CHOLECYSTECTOMY N/A 2/1/2023    Procedure: laparoscopic cholecystectomy;  Surgeon: Feliz Gar MD;  Location:  OR         SOCIAL HISTORY:  Social History     Tobacco Use    Smoking status: Never    Smokeless tobacco: Never       FAMILY HISTORY:  No known history of liver disease.    PHYSICAL EXAM:   /70   Pulse (!) 49   Temp 97.8  F (36.6  C) (Oral)   Resp 16   SpO2 98%     Constitutional: NAD, comfortable  Cardiovascular: RRR, normal S1 and S2, no r/c/g/m  Respiratory: CTAB  Psychiatric: mentation appears normal and affect normal/bright  Head: Normocephalic. Atraumatic.    Neck: normal size, trachea midline  Eyes:  no icterus  ENT:  hearing adequate  Abdomen:   Auscultation: normal  Appearance: non distended  Palpation: minimally tender to palpation  NEURO: grossly negative  SKIN: no suspicious lesions or rashes            ADDITIONAL COMMENTS:   I reviewed the patient's new clinical lab test results.   Recent Labs   Lab Test 07/31/24 0443 07/30/24  1400 02/04/23  1008   WBC 5.5 5.7 4.8   HGB 13.8 15.3 12.9*   MCV 84 83 80    244 141*     Recent Labs   Lab Test 07/31/24 0443 07/30/24  1400 02/04/23  1008    142 138   POTASSIUM 4.4 4.2 3.2*   CHLORIDE 104 103 101   CO2 28 28 27   BUN 8.2 10.7 5.5*   CR 1.15 1.23* 1.01   ANIONGAP 8 11 10   ERIK 9.2 9.8 9.2   * 95 130*     Recent Labs   Lab Test 07/31/24  0443 07/30/24  1625 07/30/24  1400 02/22/23  1148 02/04/23  1008 02/01/23  0538 01/31/23  2346 01/31/23  2217   ALBUMIN 4.4  --  5.0  --  3.9   < >  --  5.0   BILITOTAL 2.3*  --  1.5*  --  1.5*   < >  --  0.4   *  --  62  --  184*   < >  --  39    *  --  62*  --  88*   < >  --  42   ALKPHOS 104  --  100  --  96   < >  --  91   PROTEIN  --  50*  --  Negative  --   --  10*  --    LIPASE 29  --  28  --   --   --   --  40    < > = values in this interval not displayed.       CT Abdomen 7/30/24: negative    RUQ US 7/31/24: fatty liver, no biliary dilation, CBD 6mm    MRCP 7/31/24:  1. No evidence for choledocholithiasis.   2. No acute process demonstrated.          CONSULTATION ASSESSMENT AND PLAN:    Active Problems:    Transaminitis    Assessment: Patient with abdominal pain, vomiting x2 days with elevation of AST, ALT and bili.  High AST and ALT can be seen with viral infection including COVID, EBV, Hep A.  Also, could be a gastroenteritis causing the symptoms and liver tests elevation.  No evidence of biliary obstruction on MRCP or US so that is much less likely.  Infiltrative process in the liver (such as malignancy) also unlikely given acute onset and liver imaging.      Plan:   - Check for COVID, EBV IgM, Acute hepatitis panel  - supportive cares  - follow LFTS  - advance diet as tolerated  - no liver biopsy at this time      Nel Andino MD  [facility]  Office:  [phone]    Approximately 40 minutes of total time was spent providing patient care, including patient evaluation, reviewing documentation/test results, and .

## 2024-07-31 NOTE — ED TRIAGE NOTES
Pt c/o LUQ abd pian starting 3 days ago. Pt endorses N&V and denies diarrhea. Pt states he was recently seen in the ED for the same S&S. Pt wife at bedside.      Triage Assessment (Adult)       Row Name 07/31/24 0317          Triage Assessment    Airway WDL WDL        Respiratory WDL    Respiratory WDL WDL        Skin Circulation/Temperature WDL    Skin Circulation/Temperature WDL WDL        Cardiac WDL    Cardiac WDL X     Cardiac Rhythm SB        Peripheral/Neurovascular WDL    Peripheral Neurovascular WDL WDL        Cognitive/Neuro/Behavioral WDL    Cognitive/Neuro/Behavioral WDL WDL

## 2024-07-31 NOTE — PLAN OF CARE
Goal Outcome Evaluation:      Plan of Care Reviewed With: patient    Admission    Patient arrives to room 621 via cart from ED at 1735.  Care plan note:     Summary: Transaminitis; elevated bilirubin; abdominal pain (epigastric)  DATE & TIME: 7/31/24 9972-4380   Cognitive Concerns/ Orientation : A&Ox4   BEHAVIOR & AGGRESSION TOOL COLOR: Green  CIWA SCORE: NA   ABNL VS/O2: /79, HR 50s, other VSS, O2 96% RA, LS clear, no sxs of respiratory distress, denies SOB.    MOBILITY: Independent, steady  PAIN MANAGEMENT: C/o abd pain 2/10, tolerable, declined pain med.   DIET: ADAT order in place, tolerated clears, advanced to full liquid.   BOWEL/BLADDER: continent, denies urinary symptoms. Last BM 6/19 per pt report  ABNL LAB/BG: ; ; Bili T 2.3, ; Covid19 positive.   DRAIN/DEVICES: PIV x1 LR infusing 125 mL/hr   TELEMETRY RHYTHM: NA   SKIN: intact.   TESTS/PROCEDURES: none  D/C DAY/GOALS/PLACE: TBD   OTHER IMPORTANT INFO: ED admission at 1735. Special precaution maintained. IVF LR infusing 125 mL/hr. On IV Protonix 40 mg daily. GI following.       Inpatient nursing criteria listed below were met:    Did you put disposition on whiteboard and in sticky note: Yes  Full skin assessment done (add LDA if skin issue present). Initials of 2nd RN :No, pt refused full skin assessment   Isolation education started/completed Yes  Patient allergies verified with patient: Yes  Fall Risk? (Care plan updated, Education given and documented) NA  Primary Care Plan initiated: Yes  Home medications documented in belongings flowsheet: Yes  Patient belongings documented in belongings flowsheet: Yes  Reminder note (belongings/ medications) placed in discharge instructions:Yes  Admission profile/ required documentation complete: Yes  If patient is a 72 hour hold/Commitment are belongings removed from room and locked up? NA

## 2024-07-31 NOTE — PROGRESS NOTES
RECEIVING UNIT ED HANDOFF REVIEW    ED Nurse Handoff Report was reviewed by: Haritha Jones RN on July 31, 2024 at 5:10 PM

## 2024-08-01 VITALS
TEMPERATURE: 97.8 F | HEART RATE: 61 BPM | SYSTOLIC BLOOD PRESSURE: 132 MMHG | WEIGHT: 198.4 LBS | RESPIRATION RATE: 18 BRPM | OXYGEN SATURATION: 95 % | BODY MASS INDEX: 26.91 KG/M2 | DIASTOLIC BLOOD PRESSURE: 80 MMHG

## 2024-08-01 LAB
ALBUMIN SERPL BCG-MCNC: 4.4 G/DL (ref 3.5–5.2)
ALP SERPL-CCNC: 109 U/L (ref 40–150)
ALT SERPL W P-5'-P-CCNC: 465 U/L (ref 0–70)
ANION GAP SERPL CALCULATED.3IONS-SCNC: 8 MMOL/L (ref 7–15)
AST SERPL W P-5'-P-CCNC: 190 U/L (ref 0–45)
BILIRUB DIRECT SERPL-MCNC: 0.26 MG/DL (ref 0–0.3)
BILIRUB SERPL-MCNC: 0.8 MG/DL
BUN SERPL-MCNC: 5.5 MG/DL (ref 6–20)
CALCIUM SERPL-MCNC: 9.7 MG/DL (ref 8.8–10.4)
CHLORIDE SERPL-SCNC: 104 MMOL/L (ref 98–107)
CREAT SERPL-MCNC: 1.17 MG/DL (ref 0.67–1.17)
EGFRCR SERPLBLD CKD-EPI 2021: 89 ML/MIN/1.73M2
ERYTHROCYTE [DISTWIDTH] IN BLOOD BY AUTOMATED COUNT: 12.6 % (ref 10–15)
GLUCOSE SERPL-MCNC: 91 MG/DL (ref 70–99)
HAV IGM SERPL QL IA: NONREACTIVE
HBV CORE IGM SERPL QL IA: NONREACTIVE
HBV SURFACE AG SERPL QL IA: NONREACTIVE
HCO3 SERPL-SCNC: 29 MMOL/L (ref 22–29)
HCT VFR BLD AUTO: 41.6 % (ref 40–53)
HCV AB SERPL QL IA: NONREACTIVE
HGB BLD-MCNC: 13.7 G/DL (ref 13.3–17.7)
MCH RBC QN AUTO: 27.3 PG (ref 26.5–33)
MCHC RBC AUTO-ENTMCNC: 32.9 G/DL (ref 31.5–36.5)
MCV RBC AUTO: 83 FL (ref 78–100)
PLATELET # BLD AUTO: 188 10E3/UL (ref 150–450)
POTASSIUM SERPL-SCNC: 3.6 MMOL/L (ref 3.4–5.3)
PROT SERPL-MCNC: 7 G/DL (ref 6.4–8.3)
RBC # BLD AUTO: 5.01 10E6/UL (ref 4.4–5.9)
SODIUM SERPL-SCNC: 141 MMOL/L (ref 135–145)
WBC # BLD AUTO: 4.3 10E3/UL (ref 4–11)

## 2024-08-01 PROCEDURE — 80076 HEPATIC FUNCTION PANEL: CPT | Performed by: PHYSICIAN ASSISTANT

## 2024-08-01 PROCEDURE — 99239 HOSP IP/OBS DSCHRG MGMT >30: CPT | Performed by: HOSPITALIST

## 2024-08-01 PROCEDURE — 82248 BILIRUBIN DIRECT: CPT | Performed by: INTERNAL MEDICINE

## 2024-08-01 PROCEDURE — 36415 COLL VENOUS BLD VENIPUNCTURE: CPT | Performed by: PHYSICIAN ASSISTANT

## 2024-08-01 PROCEDURE — 85048 AUTOMATED LEUKOCYTE COUNT: CPT | Performed by: PHYSICIAN ASSISTANT

## 2024-08-01 PROCEDURE — 258N000003 HC RX IP 258 OP 636: Performed by: PHYSICIAN ASSISTANT

## 2024-08-01 PROCEDURE — 250N000011 HC RX IP 250 OP 636: Performed by: HOSPITALIST

## 2024-08-01 PROCEDURE — 250N000011 HC RX IP 250 OP 636: Performed by: PHYSICIAN ASSISTANT

## 2024-08-01 RX ORDER — ENOXAPARIN SODIUM 100 MG/ML
40 INJECTION SUBCUTANEOUS EVERY 24 HOURS
Status: DISCONTINUED | OUTPATIENT
Start: 2024-08-01 | End: 2024-08-01 | Stop reason: HOSPADM

## 2024-08-01 RX ADMIN — PANTOPRAZOLE SODIUM 40 MG: 40 INJECTION, POWDER, FOR SOLUTION INTRAVENOUS at 07:34

## 2024-08-01 RX ADMIN — ENOXAPARIN SODIUM 40 MG: 40 INJECTION SUBCUTANEOUS at 11:32

## 2024-08-01 RX ADMIN — SODIUM CHLORIDE, POTASSIUM CHLORIDE, SODIUM LACTATE AND CALCIUM CHLORIDE: 600; 310; 30; 20 INJECTION, SOLUTION INTRAVENOUS at 07:34

## 2024-08-01 RX ADMIN — SODIUM CHLORIDE, POTASSIUM CHLORIDE, SODIUM LACTATE AND CALCIUM CHLORIDE: 600; 310; 30; 20 INJECTION, SOLUTION INTRAVENOUS at 00:00

## 2024-08-01 ASSESSMENT — ACTIVITIES OF DAILY LIVING (ADL)
ADLS_ACUITY_SCORE: 18

## 2024-08-01 NOTE — DISCHARGE SUMMARY
[facility]  Hospitalist Discharge Summary      Date of Admission:  7/31/2024  Date of Discharge:  8/1/2024  4:02 PM  Discharging Provider: Sadi Mcnulty MD  Discharge Service: Hospitalist Service    Discharge Diagnoses   Elevated transaminases due to COVID-19 infection    Clinically Significant Risk Factors     # Overweight: Estimated body mass index is 26.91 kg/m  as calculated from the following:    Height as of 7/30/24: 1.829 m (6').    Weight as of this encounter: 90 kg (198 lb 6.4 oz).       Follow-ups Needed After Discharge   Follow-up Appointments     Follow-up and recommended labs and tests       Follow up with primary care provider within 7 days for hospital follow-   up.  The following labs/tests are recommended: LFT's.            Unresulted Labs Ordered in the Past 30 Days of this Admission       Date and Time Order Name Status Description    7/31/2024  8:36 AM EBV Capsid Antibody IgM In process         These results will be followed up by: hospitalist group    Discharge Disposition   Discharged to home  Condition at discharge: Stable    Hospital Course   Eddie Carey is a 23 year old male with hx of cholecystitis and choledocholithiasis s/p lap cholecystectomy and ERCP (1/2023) who presented to the ED for the third time in 3 days for further evaluation of postprandial epigastric pain, found to have elevated transaminases and bilirubin.      Elevated transaminases and bilirubin due to COVID-19 infection  S/p cholecystectomy (2023)  Hepatic steatosis  On admission, , , Tbili 2.3; significantly changed from two days prior.  CT abd/pelvis from 7/29 with question of mild pancolitis, repeat CT 7/30 negative for acute pathology. RUQ U/S on 7/31 with hepatic steatosis, otherwise unremarkable. GI consulted, underwent MRCP 7/31 which was negative for acute pathology.  Hepatitis serologies negative, but COVID-19 positive 7/31.  He was managed conservatively with  improvement in LFT's and was able to advance diet without recurrent nausea or abdominal pain and discharged home.     Consultations This Hospital Stay   GASTROENTEROLOGY IP CONSULT    Code Status   Full Code    Time Spent on this Encounter   I, Sadi Mcnulty MD, personally saw the patient today and spent greater than 30 minutes discharging this patient.       Sadi Mcnulty MD  [place] [address] [facility]  [address] [place] [place] [address]  Phone: [phone]  ______________________________________________________________________    Physical Exam   Vital Signs: Temp: 97.8  F (36.6  C) Temp src: Oral BP: 132/80 Pulse: 61   Resp: 18 SpO2: 95 % O2 Device: None (Room air)    Weight: 198 lbs 6.4 oz  General Appearance: Well nourished male in NAD  Respiratory: lungs CTAB, no wheezes or crackles  Cardiovascular: RRR, normal s1/s2 without murmur  GI: normal bowel sounds, nontender, nondistended, abdomen soft  Skin: no peripheral edema   Other: Awake and alert, CN grossly intact         Primary Care Physician   Physician No Ref-Primary    Discharge Orders      Reason for your hospital stay    You were admitted for abnormal liver function tests and abdominal which appears to be due to COVID-19 infection.     Follow-up and recommended labs and tests     Follow up with primary care provider within 7 days for hospital follow- up.  The following labs/tests are recommended: LFT's.     Activity    Your activity upon discharge: activity as tolerated     Discharge Instructions    You may return to work on 8/2/24 without restriction if symptoms continue to improve and you remain without fever.  You should try to isolate as much as possible for the next 5 days, but if around people, wear a well fitting mask.     Diet    Follow this diet upon discharge:       Regular Diet Adult       Significant Results and Procedures   Most Recent 3 CBC's:  Recent Labs   Lab Test 08/01/24  1045 07/31/24  0443  07/30/24  1400   WBC 4.3 5.5 5.7   HGB 13.7 13.8 15.3   MCV 83 84 83    195 244     Most Recent 3 BMP's:  Recent Labs   Lab Test 08/01/24  1045 07/31/24  0443 07/30/24  1400    140 142   POTASSIUM 3.6 4.4 4.2   CHLORIDE 104 104 103   CO2 29 28 28   BUN 5.5* 8.2 10.7   CR 1.17 1.15 1.23*   ANIONGAP 8 8 11   ERIK 9.7 9.2 9.8   GLC 91 120* 95     Most Recent 2 LFT's:  Recent Labs   Lab Test 08/01/24  1045 07/31/24  0443   * 628*   * 542*   ALKPHOS 109 104   BILITOTAL 0.8 2.3*   ,   Results for orders placed or performed during the hospital encounter of 07/31/24   US Abdomen Limited (RUQ)    Narrative    EXAM: US ABDOMEN LIMITED  LOCATION: [facility]  DATE: 7/31/2024    INDICATION: epigastric pain, elevated LFTs  COMPARISON: CT from 7/30/2024  TECHNIQUE: Limited abdominal ultrasound.    FINDINGS:    GALLBLADDER: Surgically removed.    BILE DUCTS: No biliary dilatation. The common duct measures 6 mm.    LIVER: Increased echogenicity from diffuse fatty infiltration. No focal mass.    RIGHT KIDNEY: No hydronephrosis.    PANCREAS: The visualized portions are normal.    No ascites.      Impression    IMPRESSION:  1.  Hepatic steatosis.       MR Abdomen MRCP w/o & w Contrast    Narrative    MAGNETIC RESONANCE CHOLANGIOPANCREATOGRAPHY  7/31/2024 10:07 AM     HISTORY: Recurrent epigastric abd pain elevated bili, transaminitis    COMPARISON: CT scan from July 30, 2024    TECHNIQUE: Multiplanar, multisequence images of the abdomen acquired  before and after administration of 10 mL Gadavist intravenous  contrast. MRCP images and coronal 3-D thin section T2-weighted MRCP  images through the biliary tree. 3D image reformatting was performed  on the acquisition scanner.    FINDINGS: The gallbladder is absent. There is no intra- or  extrahepatic biliary dilatation. There is no choledocholithiasis. No  biliary strictures. The pancreatic duct is not dilated. No pancreatic  duct  strictures. No significant sidebranch visualization. No cystic  lesions within the pancreas. The signal intensity of the liver is  within normal limits without evidence for hepatic steatosis. The  signal intensity of the pancreas within normal limits without evidence  for pancreatitis. Visualized kidneys demonstrate unremarkable signal  intensity without hydronephrosis. Adrenal glands and spleen are  unremarkable. Visualized osseous structures unremarkable. After  administration of intravenous contrast, solid organs demonstrate no  enhancing focal lesions.      Impression    IMPRESSION:   1. No evidence for choledocholithiasis.   2. No acute process demonstrated.    CANDACE VELASCO MD         SYSTEM ID:  [id number]       Discharge Medications   Current Discharge Medication List        CONTINUE these medications which have NOT CHANGED    Details   bismuth subsalicylate (PEPTO BISMOL) 262 MG/15ML suspension Take 15 mLs by mouth every 4 hours as needed for indigestion      hyoscyamine (LEVSIN) 0.125 MG tablet Take 1-2 tablets (125-250 mcg) by mouth every 4 hours as needed for cramping  Qty: 30 tablet, Refills: 0      ondansetron (ZOFRAN ODT) 4 MG ODT tab Take 1 tablet (4 mg) by mouth every 6 hours as needed for nausea  Qty: 10 tablet, Refills: 0    Associated Diagnoses: Postoperative pain           Allergies   No Known Allergies

## 2024-08-01 NOTE — PLAN OF CARE
Summary: Transaminitis; elevated bilirubin; abdominal pain (epigastric)    DATE & TIME: 7/31/24-8/1/24 3701-8585 Cognitive Concerns/ Orientation : A&Ox4   BEHAVIOR & AGGRESSION TOOL COLOR: Green  CIWA SCORE: NA   ABNL VS/O2: VSS on RA, LS clear, no sxs of respiratory distress, denies SOB.    MOBILITY: Independent, steady  PAIN MANAGEMENT: C/o abd pain 2/10, tolerable, declined pain med.   DIET: ADAT order in place, tolerated clears, advanced to full liquid.   BOWEL/BLADDER: continent, denies urinary symptoms. Last BM 6/19 per pt report  ABNL LAB/BG: ; ; Bili T 2.3, ; Covid19 positive.   DRAIN/DEVICES: PIV x1 LR infusing 125 mL/hr   TELEMETRY RHYTHM: NA   SKIN: intact.   TESTS/PROCEDURES: none  D/C DAY/GOALS/PLACE: TBD   OTHER IMPORTANT INFO:Special precaution maintained. IVF LR infusing 125 mL/hr. On IV Protonix 40 mg daily. GI following.

## 2024-08-01 NOTE — PROGRESS NOTES
Pt alert and oriented x4. Ind in room. Continent of bowel and bladder. Clear liquid diet. Precautions maintained. Denies pain. Vss

## 2024-08-01 NOTE — PLAN OF CARE
Goal Outcome Evaluation:      Plan of Care Reviewed With: patient, spouse    Overall Patient Progress: improvingOverall Patient Progress: improving    Discharge    Patient discharged to home with spouse  Care plan note  Summary: Transaminitis; elevated bilirubin; abdominal pain (epigastric)  DATE & TIME: 8/1/24 2965-6310  Cognitive Concerns/ Orientation : A&Ox4   BEHAVIOR & AGGRESSION TOOL COLOR: Green  CIWA SCORE: NA   ABNL VS/O2: VSS on RA, LS clear, no sxs of respiratory distress, denies SOB.    MOBILITY: Independent, steady  PAIN MANAGEMENT: denies    DIET: Tolerating Regular diet, denies pain, nausea.    BOWEL/BLADDER: continent   ABNL LAB/BG: ; , improving.   DRAIN/DEVICES: PIV x1 removed prior to discharge   TELEMETRY RHYTHM: NA   SKIN: intact.   TESTS/PROCEDURES: none  D/C DAY/GOALS/PLACE: Discharged home today 8/1/24 at 1600  OTHER IMPORTANT INFO:Special precaution maintained while in the hospital. GI cleared pt for discharge given improvement of labs and diet tolerance. Suggested repeat LFTs in 2 weeks. Went through AVS with pt/spouse, verbalized understanding. Belongings packed and sent with the pt. Denies questions and concerns at the time of discharge. MD suggested pt should try to isolate as much as possible for the next 5 days, and wear a well fitting mask around people.       Listed belongings gathered and given to patient (including from security/pharmacy). Yes  Care Plan and Patient education resolved: Yes  Prescriptions if needed, hard copies sent with patient  NA  Medication Bin checked and emptied on discharge Yes  SW/care coordinator/charge RN aware of discharge: Yes

## 2024-08-01 NOTE — PROGRESS NOTES
[facility] GASTROENTEROLOGY PROGRESS NOTE    SUBJECTIVE:  No further pain episodes, hungry    OBJECTIVE:    /80 (BP Location: Left arm, Patient Position: Semi-Holloway's, Cuff Size: Adult Regular)   Pulse 61   Temp 97.8  F (36.6  C) (Oral)   Resp 18   Wt 90 kg (198 lb 6.4 oz)   SpO2 95%   BMI 26.91 kg/m    Temp (24hrs), Av.2  F (36.8  C), Min:97.8  F (36.6  C), Max:98.4  F (36.9  C)    Patient Vitals for the past 72 hrs:   Weight   24 0700 90 kg (198 lb 6.4 oz)     No intake or output data in the 24 hours ending 24 1024        PHYSICAL EXAM    Constitutional: NAD, comfortable  Neuro: normal        Additional Comments:  ROS, FH, SH: See initial GI consult for details.    I have reviewed the patient's new clinical lab results:    Recent Labs   Lab Test 24  0443 07/30/24  1400 23  1008   WBC 5.5 5.7 4.8   HGB 13.8 15.3 12.9*   MCV 84 83 80    244 141*     Recent Labs   Lab Test 24  0443 24  1400 23  1008    142 138   POTASSIUM 4.4 4.2 3.2*   CHLORIDE 104 103 101   CO2 28 28 27   BUN 8.2 10.7 5.5*   CR 1.15 1.23* 1.01   ANIONGAP 8 11 10   ERIK 9.2 9.8 9.2     Recent Labs   Lab Test 24  0443 24  1625 24  1400 23  1148 23  1008 23  0538 23  2346 23  2217   ALBUMIN 4.4  --  5.0  --  3.9   < >  --  5.0   BILITOTAL 2.3*  --  1.5*  --  1.5*   < >  --  0.4   *  --  62  --  184*   < >  --  39   *  --  62*  --  88*   < >  --  42   ALKPHOS 104  --  100  --  96   < >  --  91   PROTEIN  --  50*  --  Negative  --   --  10*  --    LIPASE 29  --  28  --   --   --   --  40    < > = values in this interval not displayed.       CT Abdomen 24: negative     RUQ US 24: fatty liver, no biliary dilation, CBD 6mm     MRCP 24:  1. No evidence for choledocholithiasis.   2. No acute process demonstrated.              CONSULTATION ASSESSMENT AND PLAN:    Active Problems:    Transaminitis    Assessment: Patient  admitted 7/31/24 with abdominal pain, vomiting x2 days with elevation of AST, ALT and bili.  High AST and ALT can be seen with viral infection including COVID, EBV, Hep A.  Also, could be a gastroenteritis causing the symptoms and liver tests elevation.  No evidence of biliary obstruction on MRCP or US so that is much less likely.  Infiltrative process in the liver (such as malignancy) also unlikely given acute onset and liver imaging.    COVID testing was positive after patient was admitted.  I think this is the likely reason for his symptoms.       Plan:   - advance diet - if tolerated consider discharge today  - await labs, still not drawn as of 10:25am    ADDENDUM: Liver tests back and improving.  If patient tolerating diet he can discharge home today. Recommend repeating LFTs in 2 week, if they remain abnormal we would be happy to see him in liver clinic.      Nel Andino  [facility]  Office:  [phone]    Approximately 15 minutes of total time was spent providing patient care, including patient evaluation, reviewing documentation/test results, and .

## 2024-08-02 LAB
EBV VCA IGM SER IA-ACNC: <10 U/ML
EBV VCA IGM SER IA-ACNC: NORMAL

## 2024-08-03 ENCOUNTER — PATIENT OUTREACH (OUTPATIENT)
Dept: CARE COORDINATION | Facility: CLINIC | Age: 24
End: 2024-08-03
Payer: COMMERCIAL

## 2024-08-03 NOTE — PROGRESS NOTES
Connected Care Resource Center:   [facility] Resource Center Contact  [facility]/Voicemail     Clinical Data: Post-Discharge Outreach     Outreach attempted x 2.  Left message on patient's voicemail, providing [facility]'s central phone number of [phone] ([phone]) for questions/concerns and/or to schedule an appt with an [facility] provider, if they do not have a PCP.      Plan:  [facility] will do no further outreaches at this time.       Africa Branham MA  Connected Care Resource Center, [facility]    *Connected Care Resource Team does NOT follow patient ongoing. Referrals are identified based on internal discharge reports and the outreach is to ensure patient has an understanding of their discharge instructions.

## 2025-08-10 ENCOUNTER — HEALTH MAINTENANCE LETTER (OUTPATIENT)
Age: 25
End: 2025-08-10

## (undated) DEVICE — PREP CHLORAPREP 26ML TINTED HI-LITE ORANGE 930815

## (undated) DEVICE — Device

## (undated) DEVICE — MANIFOLD NEPTUNE 4 PORT 700-20

## (undated) DEVICE — ENDO TROCAR SLEEVE KII Z-THREADED 05X100MM CTS02

## (undated) DEVICE — STPL ENDO ARTICULATING 45MM EC45A

## (undated) DEVICE — SOL WATER IRRIG 1000ML BOTTLE 2F7114

## (undated) DEVICE — PACK LAP CHOLE SLC15LCFSD

## (undated) DEVICE — SOL NACL 0.9% INJ 1000ML BAG 2B1324X

## (undated) DEVICE — WIRE GUIDE 0.035"X450CM JAGWIRE HP STR TIP M00556580

## (undated) DEVICE — ESU ENDO SCISSORS 5MM CVD 5DCS

## (undated) DEVICE — ENDO TROCAR FIRST ENTRY KII FIOS Z-THRD 05X100MM CTF03

## (undated) DEVICE — SUCTION IRR STRYKERFLOW II W/TIP 250-070-520

## (undated) DEVICE — ESU HOLSTER PLASTIC DISP E2400

## (undated) DEVICE — BALLOON EXTRACTION 3-LUMEN 15X190MM 2.8MM TL B-V233P-A

## (undated) DEVICE — LINEN TOWEL PACK X5 5464

## (undated) DEVICE — GLOVE BIOGEL PI MICRO SZ 7.5 48575

## (undated) DEVICE — SU VICRYL 0 UR-6 27" J603H

## (undated) DEVICE — DEVICE SUTURE PASSER 14GA WECK EFX EFXSP2

## (undated) DEVICE — ENDO TROCAR FIRST ENTRY KII FIOS Z-THRD 12X100MM CTF73

## (undated) DEVICE — SUCTION IRRIGATION STRYKFLOW II W/TIP DISP 250-070-520

## (undated) DEVICE — EVAC SYSTEM CLEAR FLOW SC082500

## (undated) DEVICE — ENDO TROCAR BLUNT TIP KII BALLOON 12X100MM C0R47

## (undated) DEVICE — ESU GROUND PAD UNIVERSAL W/O CORD

## (undated) DEVICE — DECANTER VIAL 2006S

## (undated) DEVICE — ENDO SCOPE WARMER LF TM500

## (undated) DEVICE — SU MONOCRYL 4-0 PS-2 18" UND Y496G

## (undated) DEVICE — ESU HOLDER LAP INST DISP PURPLE LONG 330MM H-PRO-330

## (undated) DEVICE — GLOVE BIOGEL PI MICRO INDICATOR UNDERGLOVE SZ 7.5 48975

## (undated) DEVICE — CLIP APPLIER ENDO 5MM M/L LIGAMAX EL5ML

## (undated) DEVICE — ENDO BITE BLOCK 60 MAXI LF 00712804

## (undated) DEVICE — SPHINCTEROTOME 7FRX25MM TRITOME G22555

## (undated) RX ORDER — PROPOFOL 10 MG/ML
INJECTION, EMULSION INTRAVENOUS
Status: DISPENSED
Start: 2023-02-03

## (undated) RX ORDER — NEOSTIGMINE METHYLSULFATE 1 MG/ML
VIAL (ML) INJECTION
Status: DISPENSED
Start: 2023-02-01

## (undated) RX ORDER — HYDROMORPHONE HYDROCHLORIDE 1 MG/ML
INJECTION, SOLUTION INTRAMUSCULAR; INTRAVENOUS; SUBCUTANEOUS
Status: DISPENSED
Start: 2023-02-01

## (undated) RX ORDER — DEXAMETHASONE SODIUM PHOSPHATE 4 MG/ML
INJECTION, SOLUTION INTRA-ARTICULAR; INTRALESIONAL; INTRAMUSCULAR; INTRAVENOUS; SOFT TISSUE
Status: DISPENSED
Start: 2023-02-01

## (undated) RX ORDER — FENTANYL CITRATE 50 UG/ML
INJECTION, SOLUTION INTRAMUSCULAR; INTRAVENOUS
Status: DISPENSED
Start: 2023-02-03

## (undated) RX ORDER — PROPOFOL 10 MG/ML
INJECTION, EMULSION INTRAVENOUS
Status: DISPENSED
Start: 2023-02-01

## (undated) RX ORDER — ONDANSETRON 2 MG/ML
INJECTION INTRAMUSCULAR; INTRAVENOUS
Status: DISPENSED
Start: 2023-02-01

## (undated) RX ORDER — FENTANYL CITRATE 50 UG/ML
INJECTION, SOLUTION INTRAMUSCULAR; INTRAVENOUS
Status: DISPENSED
Start: 2023-02-01

## (undated) RX ORDER — FENTANYL CITRATE 0.05 MG/ML
INJECTION, SOLUTION INTRAMUSCULAR; INTRAVENOUS
Status: DISPENSED
Start: 2023-02-01

## (undated) RX ORDER — GLYCOPYRROLATE 0.2 MG/ML
INJECTION, SOLUTION INTRAMUSCULAR; INTRAVENOUS
Status: DISPENSED
Start: 2023-02-01

## (undated) RX ORDER — PIPERACILLIN SODIUM, TAZOBACTAM SODIUM 3; .375 G/15ML; G/15ML
INJECTION, POWDER, LYOPHILIZED, FOR SOLUTION INTRAVENOUS
Status: DISPENSED
Start: 2023-02-03

## (undated) RX ORDER — BUPIVACAINE HYDROCHLORIDE AND EPINEPHRINE 2.5; 5 MG/ML; UG/ML
INJECTION, SOLUTION EPIDURAL; INFILTRATION; INTRACAUDAL; PERINEURAL
Status: DISPENSED
Start: 2023-02-01